# Patient Record
Sex: MALE | Race: WHITE | NOT HISPANIC OR LATINO | URBAN - METROPOLITAN AREA
[De-identification: names, ages, dates, MRNs, and addresses within clinical notes are randomized per-mention and may not be internally consistent; named-entity substitution may affect disease eponyms.]

---

## 2021-12-15 ENCOUNTER — WEB ENCOUNTER (OUTPATIENT)
Dept: URBAN - METROPOLITAN AREA CLINIC 74 | Facility: CLINIC | Age: 66
End: 2021-12-15

## 2021-12-15 ENCOUNTER — OFFICE VISIT (OUTPATIENT)
Dept: URBAN - METROPOLITAN AREA CLINIC 74 | Facility: CLINIC | Age: 66
End: 2021-12-15
Payer: MEDICARE

## 2021-12-15 DIAGNOSIS — R19.5 LOOSE STOOLS: ICD-10-CM

## 2021-12-15 DIAGNOSIS — K64.2 GRADE III HEMORRHOIDS: ICD-10-CM

## 2021-12-15 DIAGNOSIS — K62.5 RECTAL BLEED: ICD-10-CM

## 2021-12-15 DIAGNOSIS — Z79.01 BLOOD THINNED DUE TO LONG-TERM ANTICOAGULANT USE: ICD-10-CM

## 2021-12-15 PROBLEM — 711150003: Status: ACTIVE | Noted: 2021-12-15

## 2021-12-15 PROCEDURE — 99203 OFFICE O/P NEW LOW 30 MIN: CPT | Performed by: INTERNAL MEDICINE

## 2021-12-15 RX ORDER — COLESTIPOL HYDROCHLORIDE 1 G/1
1 TABLET, FILM COATED ORAL BID
Qty: 180 | Refills: 3 | OUTPATIENT
Start: 2021-12-15

## 2021-12-15 NOTE — HPI-TODAY'S VISIT:
Hx polyps, Colonoscopy 2018 Dr. Damon, 2 TA polyps, next due 2023. Hemorrhoids. Prior banding x4 2018 with Dr. Orellana. He did well for a wile. Now with chronic loose stools 2-3 daily, he requests Rx. No prior Debby. He has Afib, new onset, and Started Xarelto September, now he has hemorrhoid bleeding, painless, with most BM. Sometimes moderate dripping blood.

## 2022-01-07 ENCOUNTER — OFFICE VISIT (OUTPATIENT)
Dept: URBAN - METROPOLITAN AREA CLINIC 40 | Facility: CLINIC | Age: 67
End: 2022-01-07

## 2022-02-07 ENCOUNTER — TELEPHONE ENCOUNTER (OUTPATIENT)
Dept: URBAN - METROPOLITAN AREA CLINIC 23 | Facility: CLINIC | Age: 67
End: 2022-02-07

## 2023-01-31 ENCOUNTER — CLAIMS CREATED FROM THE CLAIM WINDOW (OUTPATIENT)
Dept: URBAN - METROPOLITAN AREA CLINIC 74 | Facility: CLINIC | Age: 68
End: 2023-01-31
Payer: MEDICARE

## 2023-01-31 VITALS
HEART RATE: 69 BPM | HEIGHT: 67 IN | BODY MASS INDEX: 32.96 KG/M2 | TEMPERATURE: 97.7 F | DIASTOLIC BLOOD PRESSURE: 69 MMHG | SYSTOLIC BLOOD PRESSURE: 119 MMHG | WEIGHT: 210 LBS

## 2023-01-31 DIAGNOSIS — Z86.010 PERSONAL HISTORY OF COLONIC POLYPS: ICD-10-CM

## 2023-01-31 DIAGNOSIS — Z79.01 CURRENT USE OF LONG TERM ANTICOAGULATION: ICD-10-CM

## 2023-01-31 PROCEDURE — 99213 OFFICE O/P EST LOW 20 MIN: CPT | Performed by: PHYSICIAN ASSISTANT

## 2023-01-31 RX ORDER — COLESTIPOL HYDROCHLORIDE 1 G/1
1 TABLET, FILM COATED ORAL BID
Qty: 180 | Refills: 3 | Status: ACTIVE | COMMUNITY
Start: 2021-12-15

## 2023-01-31 RX ORDER — POLYETHYLENE GLYCOL 3350, SODIUM SULFATE ANHYDROUS, SODIUM BICARBONATE, SODIUM CHLORIDE, POTASSIUM CHLORIDE 236; 22.74; 6.74; 5.86; 2.97 G/4L; G/4L; G/4L; G/4L; G/4L
AS DIRECTED FOR COLON CLEANSER POWDER, FOR SOLUTION ORAL
Qty: 4000 MILLILITER | Refills: 0 | OUTPATIENT
Start: 2023-01-31 | End: 2023-02-01

## 2023-01-31 NOTE — HPI-TODAY'S VISIT:
The is 67-year-old male with past medical history as noted below known to Dr. Damon is here today to schedule his surveillance colonoscopy. No new GI issues.  -- The patient denies dyspepsia, dysphagia, odynophagia, hemoptysis, hematemesis, nausea, vomiting, regurgitation, melena, constipation, diarrhea, abdominal pain, hematochezia, fever, chills, chest pain, SOB, or any other GI complaints today.  -- The patient denies ETOH, Tobacco, and Illicit drug use.  -- The patient is up to date with Flu, Pneumonia, Shingles, and COVID vaccine 3/3. -- Barbara using NSAID's.   Procedure: -- Colonoscopy with polypectomy on 05/07/2018 by Dr. Damon noted internal hemorrhoids.One 2 mm polyp in the cecum, removed with a cold biopsy forceps.  Resected and retrieved. One 2 mm polyp in the proximal ascending colon, removed with a cold biopsy forceps.  Resected and retrieved.One 1 mm polyp in the distal sigmoid colon, removed with a cold biopsy forcep.  Resected and retrieved.  The examination was otherwise normal.  Biopsy proximal ascending colon polyp tubular adenoma.  Cecum colon polyp tubular adenoma.  Distal sigmoid colon polyp mucosal prolapse type polyp.  No dysplasia or malignancy.

## 2023-03-01 PROBLEM — 428283002: Status: ACTIVE | Noted: 2023-01-20

## 2023-04-04 ENCOUNTER — OFFICE VISIT (OUTPATIENT)
Dept: URBAN - METROPOLITAN AREA SURGERY CENTER 30 | Facility: SURGERY CENTER | Age: 68
End: 2023-04-04
Payer: MEDICARE

## 2023-04-04 ENCOUNTER — CLAIMS CREATED FROM THE CLAIM WINDOW (OUTPATIENT)
Dept: URBAN - METROPOLITAN AREA CLINIC 4 | Facility: CLINIC | Age: 68
End: 2023-04-04
Payer: MEDICARE

## 2023-04-04 DIAGNOSIS — Z86.010 ADENOMAS PERSONAL HISTORY OF COLONIC POLYPS: ICD-10-CM

## 2023-04-04 DIAGNOSIS — D12.3 ADENOMA OF TRANSVERSE COLON: ICD-10-CM

## 2023-04-04 DIAGNOSIS — K63.89 OTHER SPECIFIED DISEASES OF INTESTINE: ICD-10-CM

## 2023-04-04 PROCEDURE — G8907 PT DOC NO EVENTS ON DISCHARG: HCPCS | Performed by: INTERNAL MEDICINE

## 2023-04-04 PROCEDURE — 88305 TISSUE EXAM BY PATHOLOGIST: CPT | Performed by: PATHOLOGY

## 2023-04-04 PROCEDURE — 45385 COLONOSCOPY W/LESION REMOVAL: CPT | Performed by: INTERNAL MEDICINE

## 2023-05-01 ENCOUNTER — OFFICE VISIT (OUTPATIENT)
Dept: URBAN - METROPOLITAN AREA CLINIC 74 | Facility: CLINIC | Age: 68
End: 2023-05-01

## 2023-05-23 ENCOUNTER — TELEPHONE ENCOUNTER (OUTPATIENT)
Dept: URBAN - METROPOLITAN AREA CLINIC 74 | Facility: CLINIC | Age: 68
End: 2023-05-23

## 2023-05-23 RX ORDER — COLESTIPOL HYDROCHLORIDE 1 G/1
1 TABLET TABLET, FILM COATED ORAL TWICE A DAY
Qty: 180 TABLET | Refills: 3
Start: 2021-12-15

## 2023-11-01 ENCOUNTER — CLAIMS CREATED FROM THE CLAIM WINDOW (OUTPATIENT)
Dept: URBAN - METROPOLITAN AREA CLINIC 74 | Facility: CLINIC | Age: 68
End: 2023-11-01
Payer: MEDICARE

## 2023-11-01 ENCOUNTER — LAB OUTSIDE AN ENCOUNTER (OUTPATIENT)
Dept: URBAN - METROPOLITAN AREA CLINIC 74 | Facility: CLINIC | Age: 68
End: 2023-11-01

## 2023-11-01 VITALS
DIASTOLIC BLOOD PRESSURE: 62 MMHG | SYSTOLIC BLOOD PRESSURE: 148 MMHG | OXYGEN SATURATION: 66 % | HEIGHT: 67 IN | BODY MASS INDEX: 31.61 KG/M2 | WEIGHT: 201.4 LBS | TEMPERATURE: 97.7 F | HEART RATE: 94 BPM

## 2023-11-01 DIAGNOSIS — K57.90 DIVERTICULOSIS: ICD-10-CM

## 2023-11-01 DIAGNOSIS — R10.11 RUQ ABDOMINAL PAIN: ICD-10-CM

## 2023-11-01 DIAGNOSIS — R74.01 TRANSAMINITIS: ICD-10-CM

## 2023-11-01 DIAGNOSIS — R14.0 ABDOMINAL DISTENSION: ICD-10-CM

## 2023-11-01 DIAGNOSIS — R10.823 RIGHT LOWER QUADRANT ABDOMINAL TENDERNESS WITH REBOUND TENDERNESS: ICD-10-CM

## 2023-11-01 DIAGNOSIS — K59.00 ACUTE CONSTIPATION: ICD-10-CM

## 2023-11-01 DIAGNOSIS — Z86.010 PERSONAL HISTORY OF COLONIC POLYPS: ICD-10-CM

## 2023-11-01 DIAGNOSIS — K80.20 SYMPTOMATIC CHOLELITHIASIS: ICD-10-CM

## 2023-11-01 PROBLEM — 70342003: Status: ACTIVE | Noted: 2023-11-01

## 2023-11-01 PROBLEM — 266474003: Status: ACTIVE | Noted: 2023-11-01

## 2023-11-01 PROBLEM — 197119006: Status: ACTIVE | Noted: 2023-11-01

## 2023-11-01 PROBLEM — 397881000: Status: ACTIVE | Noted: 2023-11-01

## 2023-11-01 PROCEDURE — 99214 OFFICE O/P EST MOD 30 MIN: CPT | Performed by: PHYSICIAN ASSISTANT

## 2023-11-01 RX ORDER — COLESTIPOL HYDROCHLORIDE 1 G/1
1 TABLET TABLET, FILM COATED ORAL TWICE A DAY
Qty: 180 TABLET | Refills: 3 | Status: ACTIVE | COMMUNITY
Start: 2021-12-15

## 2023-11-01 RX ORDER — ESOMEPRAZOLE MAGNESIUM 40 MG/1
1 CAPSULE CAPSULE, DELAYED RELEASE ORAL ONCE A DAY
Status: ACTIVE | COMMUNITY

## 2023-11-01 NOTE — PHYSICAL EXAM GASTROINTESTINAL
Abdomen , soft, RUQ and RLQ tenderness and rebound tenderness, distended , no guarding or rigidity , no masses palpable , normal bowel sounds , Liver and Spleen,  no hepatosplenomegaly , liver nontender

## 2023-11-01 NOTE — HPI-TODAY'S VISIT:
The is 67-year-old male with past medical history as noted below known to Dr. Damon is here today to discuss his recent abnormal labs. The patient with acute onset of abdominal pain and distention. He drinks -2 beer per week. No ETOH X one week. He went to ED at  on 10/30/2023 and 10/31/2023 with labs and imaging as noted below. He has been taking Esmoperazole 40 mg once daily for a long time. He was give Oxycodone as needed for pain and he is now constipated. His last bowel movement was on Sunday. He has nausea and vomiting since Monday multiple episodes. No much appetite. No fever or chills. No other GI symptoms. He was not told at ED that he has gallstone. He was only advised to be seen by his primary GI physician.   -- The patient denies dyspepsia, dysphagia, odynophagia, hemoptysis, hematemesis, regurgitation, melena, diarrhea,  hematochezia, fever, chills, chest pain, SOB, or any other GI complaints today.  -- The patient denies ETOH,Tobacco, and Illicit drug use.  -- The patient is up to date with Flu, Pneumonia, Shingles, and COVID vaccine 3/3. -- Barbara using NSAID's.   Diagnostic stusies; -- CT of abdomen and pelvis on 10/31/2023 with no acute abnormalities within the abdomen or pelvis. Diverticulosis without findings of acute diverticulitis.  Cholelithiasis without findings of acute cholecystitis.  -- CTA on 10/31/2023 with no acute pulmonary embolism. No active disease in the chest.  -- Labs on10/31/2023 CBC with WBC 10.6, hemoglobin 13.4, hematocrit 43.3, and platelet 264.  CMP with BUN 14, creatinine 0.8, , AST 55, , and TB 0.8.  Lipase 56.  Hemoglobin A1c 6.3.  Lipid panel with cholesterol 111, triglyceride 104, HDL 32, and LDL 58.  Procedure: -- Colonoscopy with polypectomy on 04/13/2023 by Dr. Ledezma noted one 3 mm polyp in the cecum, removed with a cold snare.  Resected and retrieved. One 3 mm polyp in the transverse colon, removed with a cold snare.  Resected and retrieved.  Diverticulosis in the sigmoid colon and in the descending colon.  Internal hemorrhoids.  Repeat colonoscopy in 5 years for surveillance.  Biopsy with hyperplastic and tubular adenoma colon polyps.

## 2023-11-08 LAB
% SATURATION: 6
A/G RATIO: 1.5
ABSOLUTE BASOPHILS: 12
ABSOLUTE EOSINOPHILS: 12
ABSOLUTE LYMPHOCYTES: 1065
ABSOLUTE MONOCYTES: 1275
ABSOLUTE NEUTROPHILS: 9337
ACTIN (SMOOTH MUSCLE) ANTIBODY (IGG): <20
ALBUMIN: 4.2
ALKALINE PHOSPHATASE: 170
ALPHA-1-ANTITRYPSIN QN: 196
ALT (SGPT): 64
ANA SCREEN, IFA: POSITIVE
AST (SGOT): 28
BASOPHILS: 0.1
BILIRUBIN, TOTAL: 2.4
BUN/CREATININE RATIO: (no result)
BUN: 14
CALCIUM: 9
CARBON DIOXIDE, TOTAL: 30
CERULOPLASMIN: 34
CHLORIDE: 102
CREATININE: 0.82
EGFR: 94
EOSINOPHILS: 0.1
FERRITIN: 49
GGT: 133
GLOBULIN, TOTAL: 2.8
GLUCOSE: 131
HBSAG SCREEN: (no result)
HEMATOCRIT: 41.5
HEMOGLOBIN: 14
HEP A AB, IGM: (no result)
HEP B CORE AB, IGM: (no result)
HEPATITIS C ANTIBODY: (no result)
HEREDITARY HEMOCHROMATOSIS DNA MUT: (no result)
IMMUNOGLOBULIN A, QN, SERUM: 228
INTERPRETATION: (no result)
INTERPRETATION: (no result)
IRON BINDING CAPACITY: 407
IRON, TOTAL: 25
LYMPHOCYTES: 9.1
MCH: 30
MCHC: 33.7
MCV: 89.1
MITOCHONDRIAL (M2) ANTIBODY: <=20
MONOCYTES: 10.9
MPV: 10.3
NEUTROPHILS: 79.8
PLATELET COUNT: 301
POTASSIUM: 4.2
PROTEIN, TOTAL: 7
RDW: 13.4
RED BLOOD CELL COUNT: 4.66
RHEUMATOID FACTOR: <14
SJOGREN'S ANTIBODY (SS-A): (no result)
SJOGREN'S ANTIBODY (SS-B): (no result)
SODIUM: 144
T-TRANSGLUTAMINASE (TTG) IGA: <1
WHITE BLOOD CELL COUNT: 11.7

## 2023-11-09 ENCOUNTER — TELEPHONE ENCOUNTER (OUTPATIENT)
Dept: URBAN - METROPOLITAN AREA CLINIC 74 | Facility: CLINIC | Age: 68
End: 2023-11-09

## 2023-11-15 PROBLEM — 197321007: Status: ACTIVE | Noted: 2023-11-15

## 2023-11-15 PROBLEM — 235595009: Status: ACTIVE | Noted: 2023-11-15

## 2023-11-21 ENCOUNTER — OFFICE VISIT (OUTPATIENT)
Dept: URBAN - METROPOLITAN AREA CLINIC 74 | Facility: CLINIC | Age: 68
End: 2023-11-21
Payer: MEDICARE

## 2023-11-21 VITALS
BODY MASS INDEX: 31.01 KG/M2 | HEART RATE: 56 BPM | SYSTOLIC BLOOD PRESSURE: 122 MMHG | WEIGHT: 197.6 LBS | OXYGEN SATURATION: 97 % | TEMPERATURE: 97.3 F | DIASTOLIC BLOOD PRESSURE: 66 MMHG | HEIGHT: 67 IN

## 2023-11-21 DIAGNOSIS — K57.90 DIVERTICULOSIS: ICD-10-CM

## 2023-11-21 DIAGNOSIS — K76.0 HEPATIC STEATOSIS: ICD-10-CM

## 2023-11-21 DIAGNOSIS — Z79.899 LONG-TERM CURRENT USE OF PROTON PUMP INHIBITOR THERAPY: ICD-10-CM

## 2023-11-21 DIAGNOSIS — K21.9 CHRONIC GERD: ICD-10-CM

## 2023-11-21 DIAGNOSIS — R74.01 TRANSAMINITIS: ICD-10-CM

## 2023-11-21 DIAGNOSIS — D50.0 IRON DEFICIENCY ANEMIA DUE TO CHRONIC BLOOD LOSS: ICD-10-CM

## 2023-11-21 DIAGNOSIS — Z86.010 PERSONAL HISTORY OF COLONIC POLYPS: ICD-10-CM

## 2023-11-21 PROBLEM — 724556004: Status: ACTIVE | Noted: 2023-11-21

## 2023-11-21 PROCEDURE — 99213 OFFICE O/P EST LOW 20 MIN: CPT | Performed by: PHYSICIAN ASSISTANT

## 2023-11-21 RX ORDER — COLESTIPOL HYDROCHLORIDE 1 G/1
1 TABLET TABLET, FILM COATED ORAL TWICE A DAY
Qty: 180 TABLET | Refills: 3 | Status: ACTIVE | COMMUNITY
Start: 2021-12-15

## 2023-11-21 RX ORDER — ESOMEPRAZOLE MAGNESIUM 40 MG/1
1 CAPSULE CAPSULE, DELAYED RELEASE ORAL ONCE A DAY
Status: ACTIVE | COMMUNITY

## 2023-11-21 NOTE — HPI-TODAY'S VISIT:
The is 67-year-old male with past medical history as noted below known to Dr. Ledezma is presenting to our clinic todayfor follow from recent admission to  for gallbladder surgery. The patient is S/P Laparoscopic cholecystectomy with cholangiogram by Dr. Sorensen on 11/03/2023. Biopsy with acute on chronic cholecystitis and cholelithiasis. Labs on 11/05/2023 at the time of discharged CMP with , AST 44, ALT 58, and TB 0.6. CBC with Hgb 11.5, Hct 35.9, and . The patient is doing well and he denies any GI issues today except some soft bowel movement but he has Colestipol and he started back taking it once or twice daily with good rseults. He states that he takes Esmoprazole 40 mg very seldom since his surgery. Feeling much better since last visit.   -- The patient denies dyspepsia, dysphagia, odynophagia, hemoptysis, hematemesis, nausea, vomiting, regurgitation, melena, diarrhea, constipation, hematochezia, fever, chills, chest pain, SOB, or any other GI complaints today.  -- The patient denies ETOH,Tobacco, and Illicit drug use.  -- The patient is up to date with Flu, Pneumonia, Shingles, and COVID vaccine 3/3. -- Barbara using NSAID's.   Diagnostic stusies: --  Labs on 11/05/2023 CMP with , AST 44, ALT 58, and TB 0.6. CBC with Hgb 11.5, Hct 35.9, and .   -- MRI/MRCP on 11/01/2023 with diffuse pericholecystic edema suggestive of acute cholecystitis. No gallstones are identified on this exam. A small calcified gallstone was preset on the recent CT. No bile duct dilation. No definite ductal filling defects identified. Hepatic steatosis. Prominent mildly enlarged periportal and portal caval lymph nodes measuring up to 1.3 cm. Prominent retroperitoneal lymph node measuring 0.8 cm. Pancreas, spleen, and adrenal glands are normal. 5 mm bilateral renal cysts.   -- Labs on 11/01/2023 with Ceruloplasmin 34, , alpha-1 antitrypsin 196, ASMA negative, AMA negative, WILBER positive 1:40, celiac's sprue negative, CMP with , AST 28, ALT 64, and T bili 2.4. Iron panel with total iron 25, total iron binding capacity 407, percentage saturation 6, and ferritin 149. CBC with WBC 11.7, hemoglobin 14.0, hematocrit 41.5, and platelet 301. HHMA negative. Acute hepatitis panel negative.        -- CT of abdomen and pelvis on 10/31/2023 with no acute abnormalities within the abdomen or pelvis. Diverticulosis without findings of acute diverticulitis.  Cholelithiasis without findings of acute cholecystitis.  -- CTA on 10/31/2023 with no acute pulmonary embolism. No active disease in the chest.  -- Labs on10/31/2023 CBC with WBC 10.6, hemoglobin 13.4, hematocrit 43.3, and platelet 264.  CMP with BUN 14, creatinine 0.8, , AST 55, , and TB 0.8.  Lipase 56.  Hemoglobin A1c 6.3.  Lipid panel with cholesterol 111, triglyceride 104, HDL 32, and LDL 58.  Procedure: -- Colonoscopy with polypectomy on 04/13/2023 by Dr. Ledezma noted one 3 mm polyp in the cecum, removed with a cold snare.  Resected and retrieved. One 3 mm polyp in the transverse colon, removed with a cold snare.  Resected and retrieved.  Diverticulosis in the sigmoid colon and in the descending colon.  Internal hemorrhoids.  Repeat colonoscopy in 5 years for surveillance.  Biopsy with hyperplastic and tubular adenoma colon polyps.

## 2023-11-22 LAB
% SATURATION: 24
A/G RATIO: 1.6
ABSOLUTE BASOPHILS: 50
ABSOLUTE EOSINOPHILS: 223
ABSOLUTE LYMPHOCYTES: 1885
ABSOLUTE MONOCYTES: 645
ABSOLUTE NEUTROPHILS: 3398
ALBUMIN: 4
ALKALINE PHOSPHATASE: 137
ALT (SGPT): 65
AST (SGOT): 34
BASOPHILS: 0.8
BILIRUBIN, TOTAL: 1.3
BUN/CREATININE RATIO: (no result)
BUN: 14
CALCIUM: 9.4
CARBON DIOXIDE, TOTAL: 27
CHLORIDE: 106
CREATININE: 0.74
EGFR: 97
EOSINOPHILS: 3.6
FERRITIN: 33
GGT: 85
GLOBULIN, TOTAL: 2.5
GLUCOSE: 84
HEMATOCRIT: 40.9
HEMOGLOBIN: 13.2
IRON BINDING CAPACITY: 373
IRON, TOTAL: 89
LYMPHOCYTES: 30.4
MCH: 28.8
MCHC: 32.3
MCV: 89.1
MONOCYTES: 10.4
MPV: 10
NEUTROPHILS: 54.8
PLATELET COUNT: 286
POTASSIUM: 4.1
PROTEIN, TOTAL: 6.5
RDW: 13.8
RED BLOOD CELL COUNT: 4.59
SODIUM: 142
WHITE BLOOD CELL COUNT: 6.2

## 2023-11-29 ENCOUNTER — APPOINTMENT (RX ONLY)
Dept: URBAN - METROPOLITAN AREA CLINIC 162 | Facility: CLINIC | Age: 68
Setting detail: DERMATOLOGY
End: 2023-11-29

## 2023-11-29 DIAGNOSIS — L81.4 OTHER MELANIN HYPERPIGMENTATION: ICD-10-CM

## 2023-11-29 DIAGNOSIS — L73.8 OTHER SPECIFIED FOLLICULAR DISORDERS: ICD-10-CM

## 2023-11-29 DIAGNOSIS — L57.0 ACTINIC KERATOSIS: ICD-10-CM

## 2023-11-29 DIAGNOSIS — L82.1 OTHER SEBORRHEIC KERATOSIS: ICD-10-CM

## 2023-11-29 DIAGNOSIS — D22 MELANOCYTIC NEVI: ICD-10-CM

## 2023-11-29 PROBLEM — D22.62 MELANOCYTIC NEVI OF LEFT UPPER LIMB, INCLUDING SHOULDER: Status: ACTIVE | Noted: 2023-11-29

## 2023-11-29 PROBLEM — D23.39 OTHER BENIGN NEOPLASM OF SKIN OF OTHER PARTS OF FACE: Status: ACTIVE | Noted: 2023-11-29

## 2023-11-29 PROBLEM — D22.5 MELANOCYTIC NEVI OF TRUNK: Status: ACTIVE | Noted: 2023-11-29

## 2023-11-29 PROBLEM — D22.39 MELANOCYTIC NEVI OF OTHER PARTS OF FACE: Status: ACTIVE | Noted: 2023-11-29

## 2023-11-29 PROBLEM — D22.61 MELANOCYTIC NEVI OF RIGHT UPPER LIMB, INCLUDING SHOULDER: Status: ACTIVE | Noted: 2023-11-29

## 2023-11-29 PROCEDURE — ? LIQUID NITROGEN

## 2023-11-29 PROCEDURE — ? COUNSELING

## 2023-11-29 PROCEDURE — 99213 OFFICE O/P EST LOW 20 MIN: CPT | Mod: 25

## 2023-11-29 PROCEDURE — 17000 DESTRUCT PREMALG LESION: CPT

## 2023-11-29 ASSESSMENT — LOCATION DETAILED DESCRIPTION DERM
LOCATION DETAILED: LEFT SUPERIOR MEDIAL FOREHEAD
LOCATION DETAILED: LEFT PROXIMAL DORSAL FOREARM
LOCATION DETAILED: RIGHT DISTAL DORSAL FOREARM
LOCATION DETAILED: LEFT ELBOW
LOCATION DETAILED: RIGHT INFERIOR LATERAL MALAR CHEEK
LOCATION DETAILED: RIGHT PROXIMAL DORSAL FOREARM
LOCATION DETAILED: INFERIOR THORACIC SPINE
LOCATION DETAILED: RIGHT ELBOW
LOCATION DETAILED: RIGHT MEDIAL INFERIOR CHEST
LOCATION DETAILED: STERNUM
LOCATION DETAILED: INFERIOR MID FOREHEAD
LOCATION DETAILED: LEFT SUPERIOR MEDIAL UPPER BACK
LOCATION DETAILED: RIGHT FOREHEAD
LOCATION DETAILED: RIGHT SUPERIOR PARIETAL SCALP
LOCATION DETAILED: PERIUMBILICAL SKIN
LOCATION DETAILED: EPIGASTRIC SKIN
LOCATION DETAILED: RIGHT SUPERIOR MEDIAL MALAR CHEEK

## 2023-11-29 ASSESSMENT — LOCATION SIMPLE DESCRIPTION DERM
LOCATION SIMPLE: SCALP
LOCATION SIMPLE: CHEST
LOCATION SIMPLE: UPPER BACK
LOCATION SIMPLE: LEFT UPPER BACK
LOCATION SIMPLE: RIGHT FOREHEAD
LOCATION SIMPLE: RIGHT CHEEK
LOCATION SIMPLE: ABDOMEN
LOCATION SIMPLE: LEFT ELBOW
LOCATION SIMPLE: LEFT FOREHEAD
LOCATION SIMPLE: RIGHT ELBOW
LOCATION SIMPLE: LEFT FOREARM
LOCATION SIMPLE: RIGHT FOREARM
LOCATION SIMPLE: INFERIOR FOREHEAD

## 2023-11-29 ASSESSMENT — LOCATION ZONE DERM
LOCATION ZONE: SCALP
LOCATION ZONE: TRUNK
LOCATION ZONE: ARM
LOCATION ZONE: FACE

## 2024-01-29 ENCOUNTER — LAB OUTSIDE AN ENCOUNTER (OUTPATIENT)
Dept: URBAN - METROPOLITAN AREA CLINIC 74 | Facility: CLINIC | Age: 69
End: 2024-01-29

## 2024-01-29 ENCOUNTER — APPOINTMENT (RX ONLY)
Dept: URBAN - METROPOLITAN AREA CLINIC 162 | Facility: CLINIC | Age: 69
Setting detail: DERMATOLOGY
End: 2024-01-29

## 2024-01-29 ENCOUNTER — DASHBOARD ENCOUNTERS (OUTPATIENT)
Age: 69
End: 2024-01-29

## 2024-01-29 ENCOUNTER — OFFICE VISIT (OUTPATIENT)
Dept: URBAN - METROPOLITAN AREA CLINIC 74 | Facility: CLINIC | Age: 69
End: 2024-01-29
Payer: MEDICARE

## 2024-01-29 VITALS
BODY MASS INDEX: 31.96 KG/M2 | HEIGHT: 67 IN | TEMPERATURE: 97 F | HEART RATE: 70 BPM | WEIGHT: 203.6 LBS | SYSTOLIC BLOOD PRESSURE: 138 MMHG | DIASTOLIC BLOOD PRESSURE: 70 MMHG

## 2024-01-29 DIAGNOSIS — B07.8 OTHER VIRAL WARTS: ICD-10-CM

## 2024-01-29 DIAGNOSIS — L73.8 OTHER SPECIFIED FOLLICULAR DISORDERS: ICD-10-CM

## 2024-01-29 DIAGNOSIS — K76.0 HEPATIC STEATOSIS: ICD-10-CM

## 2024-01-29 DIAGNOSIS — D18.0 HEMANGIOMA: ICD-10-CM

## 2024-01-29 DIAGNOSIS — D22 MELANOCYTIC NEVI: ICD-10-CM

## 2024-01-29 DIAGNOSIS — K21.9 CHRONIC GERD: ICD-10-CM

## 2024-01-29 DIAGNOSIS — L82.1 OTHER SEBORRHEIC KERATOSIS: ICD-10-CM

## 2024-01-29 DIAGNOSIS — L81.4 OTHER MELANIN HYPERPIGMENTATION: ICD-10-CM

## 2024-01-29 PROBLEM — D23.39 OTHER BENIGN NEOPLASM OF SKIN OF OTHER PARTS OF FACE: Status: ACTIVE | Noted: 2024-01-29

## 2024-01-29 PROBLEM — D18.01 HEMANGIOMA OF SKIN AND SUBCUTANEOUS TISSUE: Status: ACTIVE | Noted: 2024-01-29

## 2024-01-29 PROBLEM — D22.5 MELANOCYTIC NEVI OF TRUNK: Status: ACTIVE | Noted: 2024-01-29

## 2024-01-29 PROCEDURE — 17110 DESTRUCTION B9 LES UP TO 14: CPT

## 2024-01-29 PROCEDURE — 99213 OFFICE O/P EST LOW 20 MIN: CPT | Mod: 25

## 2024-01-29 PROCEDURE — 99214 OFFICE O/P EST MOD 30 MIN: CPT | Performed by: PHYSICIAN ASSISTANT

## 2024-01-29 PROCEDURE — ? LIQUID NITROGEN

## 2024-01-29 PROCEDURE — ? COUNSELING

## 2024-01-29 RX ORDER — ESOMEPRAZOLE MAGNESIUM 40 MG/1
1 CAPSULE CAPSULE, DELAYED RELEASE ORAL ONCE A DAY
Qty: 90 CAPSULE | Refills: 3

## 2024-01-29 RX ORDER — COLESTIPOL HYDROCHLORIDE 1 G/1
1 TABLET TABLET, FILM COATED ORAL TWICE A DAY
Qty: 180 TABLET | Refills: 3 | Status: ACTIVE | COMMUNITY
Start: 2021-12-15

## 2024-01-29 RX ORDER — ESOMEPRAZOLE MAGNESIUM 40 MG/1
1 CAPSULE CAPSULE, DELAYED RELEASE ORAL ONCE A DAY
Status: ACTIVE | COMMUNITY

## 2024-01-29 ASSESSMENT — LOCATION DETAILED DESCRIPTION DERM
LOCATION DETAILED: LEFT MEDIAL UPPER BACK
LOCATION DETAILED: SUPERIOR THORACIC SPINE
LOCATION DETAILED: RIGHT NASAL ALA
LOCATION DETAILED: INFERIOR THORACIC SPINE
LOCATION DETAILED: RIGHT MEDIAL UPPER BACK
LOCATION DETAILED: RIGHT CLAVICULAR SKIN

## 2024-01-29 ASSESSMENT — LOCATION ZONE DERM
LOCATION ZONE: TRUNK
LOCATION ZONE: NOSE

## 2024-01-29 ASSESSMENT — LOCATION SIMPLE DESCRIPTION DERM
LOCATION SIMPLE: LEFT UPPER BACK
LOCATION SIMPLE: RIGHT CLAVICULAR SKIN
LOCATION SIMPLE: RIGHT UPPER BACK
LOCATION SIMPLE: RIGHT NOSE
LOCATION SIMPLE: UPPER BACK

## 2024-01-29 NOTE — PROCEDURE: COUNSELING
Detail Level: Generalized
Detail Level: Detailed
Patient Specific Counseling (Will Not Stick From Patient To Patient): Return to clinic if changes occur, biopsy of spot if changes

## 2024-01-29 NOTE — HPI-TODAY'S VISIT:
The is 67-year-old male with past medical history as noted below known to Dr. Ledezma is presenting to our clinic today for pain below ribcage on the right side. He continues on Esomeprazole Magnesium 40 mg as needed. He states that his RUQ abdominal pain started in 12/2023. He states bowel movements helps with discomfort and eating worsening his symptoms. He two bowel movements per day. He continues on Colestipol 1 g once daily for chronic diarrhea.  No changes with urination. No other GI issues today.   -- The patient denies dyspepsia, dysphagia, odynophagia, hemoptysis, hematemesis, nausea, vomiting, regurgitation, melena, diarrhea, constipation, hematochezia, fever, chills, chest pain, SOB, or any other GI complaints today.  -- The patient denies ETOH,Tobacco, and Illicit drug use.  -- The patient is up to date with Flu, Pneumonia, Shingles, and COVID vaccine. -- Barbara using NSAID's.   Diagnostic stusies: --  Labs on 11/05/2023 CMP with , AST 44, ALT 58, and TB 0.6. CBC with Hgb 11.5, Hct 35.9, and .   -- MRI/MRCP on 11/01/2023 with diffuse pericholecystic edema suggestive of acute cholecystitis. No gallstones are identified on this exam. A small calcified gallstone was preset on the recent CT. No bile duct dilation. No definite ductal filling defects identified. Hepatic steatosis. Prominent mildly enlarged periportal and portal caval lymph nodes measuring up to 1.3 cm. Prominent retroperitoneal lymph node measuring 0.8 cm. Pancreas, spleen, and adrenal glands are normal. 5 mm bilateral renal cysts.   -- Labs on 11/01/2023 with Ceruloplasmin 34, , alpha-1 antitrypsin 196, ASMA negative, AMA negative, WILBER positive 1:40, celiac's sprue negative, CMP with , AST 28, ALT 64, and T bili 2.4. Iron panel with total iron 25, total iron binding capacity 407, percentage saturation 6, and ferritin 149. CBC with WBC 11.7, hemoglobin 14.0, hematocrit 41.5, and platelet 301. HHMA negative. Acute hepatitis panel negative.         Procedure: -- Colonoscopy with polypectomy on 04/13/2023 by Dr. Ledezma noted one 3 mm polyp in the cecum, removed with a cold snare.  Resected and retrieved. One 3 mm polyp in the transverse colon, removed with a cold snare.  Resected and retrieved.  Diverticulosis in the sigmoid colon and in the descending colon.  Internal hemorrhoids.  Repeat colonoscopy in 5 years for surveillance.  Biopsy with hyperplastic and tubular adenoma colon polyps.

## 2024-01-29 NOTE — HPI: OTHER
Condition:: FBSE
Please Describe Your Condition:: No hx of skin cancer
Condition:: Spot
Please Describe Your Condition:: Left nostril, previous fibrous papule

## 2024-02-13 ENCOUNTER — US (OUTPATIENT)
Dept: URBAN - METROPOLITAN AREA CLINIC 73 | Facility: CLINIC | Age: 69
End: 2024-02-13
Payer: MEDICARE

## 2024-02-13 DIAGNOSIS — K76.0 FATTY (CHANGE OF) LIVER: ICD-10-CM

## 2024-02-13 DIAGNOSIS — R10.11 RUQ ABDOMINAL PAIN: ICD-10-CM

## 2024-02-13 PROCEDURE — 76705 ECHO EXAM OF ABDOMEN: CPT | Performed by: INTERNAL MEDICINE

## 2024-04-09 ENCOUNTER — APPOINTMENT (RX ONLY)
Dept: URBAN - METROPOLITAN AREA CLINIC 162 | Facility: CLINIC | Age: 69
Setting detail: DERMATOLOGY
End: 2024-04-09

## 2024-04-09 PROBLEM — D48.5 NEOPLASM OF UNCERTAIN BEHAVIOR OF SKIN: Status: ACTIVE | Noted: 2024-04-09

## 2024-04-09 PROCEDURE — 11102 TANGNTL BX SKIN SINGLE LES: CPT

## 2024-04-09 PROCEDURE — ? BIOPSY BY SHAVE METHOD

## 2024-04-09 NOTE — HPI: SKIN LESION
Is This A New Presentation, Or A Follow-Up?: Skin Lesion
Additional History: Pt says bleeds, prev dx as fibrous papule

## 2024-04-17 PROBLEM — 711150003: Status: ACTIVE | Noted: 2024-04-17

## 2024-05-14 ENCOUNTER — OFFICE VISIT (OUTPATIENT)
Dept: URBAN - METROPOLITAN AREA SURGERY CENTER 30 | Facility: SURGERY CENTER | Age: 69
End: 2024-05-14

## 2024-05-14 ENCOUNTER — CLAIMS CREATED FROM THE CLAIM WINDOW (OUTPATIENT)
Dept: URBAN - METROPOLITAN AREA CLINIC 4 | Facility: CLINIC | Age: 69
End: 2024-05-14
Payer: MEDICARE

## 2024-05-14 ENCOUNTER — OFFICE VISIT (OUTPATIENT)
Dept: URBAN - METROPOLITAN AREA SURGERY CENTER 30 | Facility: SURGERY CENTER | Age: 69
End: 2024-05-14
Payer: MEDICARE

## 2024-05-14 DIAGNOSIS — I10 ACCELERATED ESSENTIAL HYPERTENSION: ICD-10-CM

## 2024-05-14 DIAGNOSIS — K29.70 GASTRITIS, UNSPECIFIED, WITHOUT BLEEDING: ICD-10-CM

## 2024-05-14 DIAGNOSIS — K29.60 ADENOPAPILLOMATOSIS GASTRICA: ICD-10-CM

## 2024-05-14 DIAGNOSIS — R12 BURNING REFLUX: ICD-10-CM

## 2024-05-14 DIAGNOSIS — R10.13 ABDOMINAL DISCOMFORT, EPIGASTRIC: ICD-10-CM

## 2024-05-14 DIAGNOSIS — K31.89 ACHYLIA: ICD-10-CM

## 2024-05-14 PROCEDURE — 43239 EGD BIOPSY SINGLE/MULTIPLE: CPT | Performed by: CLINIC/CENTER

## 2024-05-14 PROCEDURE — 88342 IMHCHEM/IMCYTCHM 1ST ANTB: CPT | Performed by: STUDENT IN AN ORGANIZED HEALTH CARE EDUCATION/TRAINING PROGRAM

## 2024-05-14 PROCEDURE — 43239 EGD BIOPSY SINGLE/MULTIPLE: CPT | Performed by: INTERNAL MEDICINE

## 2024-05-14 PROCEDURE — G8907 PT DOC NO EVENTS ON DISCHARG: HCPCS | Performed by: CLINIC/CENTER

## 2024-05-14 PROCEDURE — 88305 TISSUE EXAM BY PATHOLOGIST: CPT | Performed by: STUDENT IN AN ORGANIZED HEALTH CARE EDUCATION/TRAINING PROGRAM

## 2024-05-14 PROCEDURE — 00731 ANES UPR GI NDSC PX NOS: CPT | Performed by: NURSE ANESTHETIST, CERTIFIED REGISTERED

## 2024-06-12 PROBLEM — 196735001: Status: ACTIVE | Noted: 2024-06-12

## 2024-06-13 ENCOUNTER — OFFICE VISIT (OUTPATIENT)
Dept: URBAN - METROPOLITAN AREA CLINIC 74 | Facility: CLINIC | Age: 69
End: 2024-06-13
Payer: MEDICARE

## 2024-06-13 ENCOUNTER — LAB OUTSIDE AN ENCOUNTER (OUTPATIENT)
Dept: URBAN - METROPOLITAN AREA CLINIC 74 | Facility: CLINIC | Age: 69
End: 2024-06-13

## 2024-06-13 VITALS
SYSTOLIC BLOOD PRESSURE: 136 MMHG | DIASTOLIC BLOOD PRESSURE: 66 MMHG | HEIGHT: 67 IN | WEIGHT: 207.6 LBS | BODY MASS INDEX: 32.58 KG/M2 | HEART RATE: 64 BPM | TEMPERATURE: 99.5 F | OXYGEN SATURATION: 95 %

## 2024-06-13 DIAGNOSIS — R74.01 TRANSAMINITIS: ICD-10-CM

## 2024-06-13 DIAGNOSIS — Z79.899 LONG-TERM CURRENT USE OF PROTON PUMP INHIBITOR THERAPY: ICD-10-CM

## 2024-06-13 DIAGNOSIS — K76.0 HEPATIC STEATOSIS: ICD-10-CM

## 2024-06-13 DIAGNOSIS — K21.9 CHRONIC GERD: ICD-10-CM

## 2024-06-13 PROCEDURE — 99213 OFFICE O/P EST LOW 20 MIN: CPT | Performed by: PHYSICIAN ASSISTANT

## 2024-06-13 RX ORDER — ESOMEPRAZOLE MAGNESIUM 40 MG/1
1 CAPSULE CAPSULE, DELAYED RELEASE ORAL ONCE A DAY
Qty: 90 CAPSULE | Refills: 3 | Status: ACTIVE | COMMUNITY

## 2024-06-13 RX ORDER — COLESTIPOL HYDROCHLORIDE 1 G/1
1 TABLET TABLET, FILM COATED ORAL TWICE A DAY
Qty: 180 TABLET | Refills: 3 | Status: ACTIVE | COMMUNITY
Start: 2021-12-15

## 2024-06-13 RX ORDER — COLESTIPOL HYDROCHLORIDE 1 G/1
1 TABLET TABLET, FILM COATED ORAL TWICE A DAY
Qty: 180 TABLET | Refills: 3
Start: 2021-12-15

## 2024-06-13 RX ORDER — ESOMEPRAZOLE MAGNESIUM 40 MG/1
1 CAPSULE CAPSULE, DELAYED RELEASE ORAL ONCE A DAY
Qty: 90 CAPSULE | Refills: 3

## 2024-06-13 NOTE — HPI-TODAY'S VISIT:
The patient is 69-year-old male with past medical history as noted below known to Dr. Ledezma is presenting to our clinic today to discuss his recent labs, US, and EGD results. Esomperazole 40 mg once daily. Colestipol 1 g every 12 hours. No new GI issues today.    Diagnostic stusies: -- RUQ US on 02/13/2024 with fatty infiltration of the liver.  Status postcholecystectomy.  No free fluid.   --  Labs on 11/05/2023 CMP with , AST 44, ALT 58, and TB 0.6. CBC with Hgb 11.5, Hct 35.9, and .    Procedures: -- EGD with biopsy on 05/14/2024 by Dr. Ledezma noted normal esophagus.  Normal stomach.  Normal examined duodenum.  Biopsy with chronic gastritis.  No H.pylori organism or intestinal metaplasia.  -- Colonoscopy with polypectomy on 04/13/2023 by Dr. Ledezma noted one 3 mm polyp in the cecum, removed with a cold snare.  Resected and retrieved. One 3 mm polyp in the transverse colon, removed with a cold snare.  Resected and retrieved.  Diverticulosis in the sigmoid colon and in the descending colon.  Internal hemorrhoids.  Repeat colonoscopy in 5 years for surveillance.  Biopsy with hyperplastic and tubular adenoma colon polyps.

## 2024-06-26 LAB
ABSOLUTE BASOPHILS: 28
ABSOLUTE EOSINOPHILS: 132
ABSOLUTE LYMPHOCYTES: 1876
ABSOLUTE MONOCYTES: 490
ABSOLUTE NEUTROPHILS: 2976
ALBUMIN/GLOBULIN RATIO: 1.6
ALBUMIN: 3.8
ALKALINE PHOSPHATASE: 82
ALT: 24
AST: 19
BASOPHILS: 0.5
BILIRUBIN, TOTAL: 1
BUN/CREATININE RATIO: (no result)
CALCIUM: 8.6
CARBON DIOXIDE: 25
CHLORIDE: 106
CREATININE: 0.79
EGFR: 95
EOSINOPHILS: 2.4
FIB 4 INDEX: 1.29
FIB 4 INTERPRETATION: (no result)
GGT: 28
GLOBULIN: 2.4
GLUCOSE: 96
HEMATOCRIT: 40.1
HEMOGLOBIN: 12.9
LYMPHOCYTES: 34.1
MCH: 29.5
MCHC: 32.2
MCV: 91.6
MONOCYTES: 8.9
MPV: 9.3
NEUTROPHILS: 54.1
PLATELET COUNT: 214
PLATELET COUNT: 214
POTASSIUM: 3.9
PROTEIN, TOTAL: 6.2
RDW: 13.3
RED BLOOD CELL COUNT: 4.38
SODIUM: 141
UREA NITROGEN (BUN): 16
WHITE BLOOD CELL COUNT: 5.5

## 2024-07-08 ENCOUNTER — TELEPHONE ENCOUNTER (OUTPATIENT)
Dept: URBAN - METROPOLITAN AREA CLINIC 74 | Facility: CLINIC | Age: 69
End: 2024-07-08

## 2024-08-05 ENCOUNTER — APPOINTMENT (RX ONLY)
Dept: URBAN - METROPOLITAN AREA CLINIC 162 | Facility: CLINIC | Age: 69
Setting detail: DERMATOLOGY
End: 2024-08-05

## 2024-08-05 DIAGNOSIS — D485 NEOPLASM OF UNCERTAIN BEHAVIOR OF SKIN: ICD-10-CM

## 2024-08-05 PROBLEM — D48.5 NEOPLASM OF UNCERTAIN BEHAVIOR OF SKIN: Status: ACTIVE | Noted: 2024-08-05

## 2024-08-05 PROCEDURE — ? SHAVE REMOVAL

## 2024-08-05 PROCEDURE — 11301 SHAVE SKIN LESION 0.6-1.0 CM: CPT

## 2024-08-05 PROCEDURE — 11311 SHAVE SKIN LESION 0.6-1.0 CM: CPT

## 2024-08-05 ASSESSMENT — LOCATION SIMPLE DESCRIPTION DERM
LOCATION SIMPLE: LEFT FOREHEAD
LOCATION SIMPLE: LEFT CLAVICULAR SKIN

## 2024-08-05 ASSESSMENT — LOCATION ZONE DERM
LOCATION ZONE: TRUNK
LOCATION ZONE: FACE

## 2024-08-05 ASSESSMENT — LOCATION DETAILED DESCRIPTION DERM
LOCATION DETAILED: LEFT CLAVICULAR SKIN
LOCATION DETAILED: LEFT INFERIOR FOREHEAD

## 2024-08-05 NOTE — PROCEDURE: SHAVE REMOVAL
Medical Necessity Information: It is in your best interest to select a reason for this procedure from the list below. All of these items fulfill various CMS LCD requirements except the new and changing color options.
Medical Necessity Clause: This procedure was medically necessary because the lesion that was treated was:
Lab: [id number]
Lab Facility: 0
Detail Level: Detailed
Was A Bandage Applied: Yes
Size Of Lesion In Cm (Required): 0.7
Depth Of Shave: dermis
Biopsy Method: Dermablade
Anesthesia Type: 1% lidocaine with epinephrine
Hemostasis: Drysol
Wound Care: Petrolatum
Render Path Notes In Note?: No
Consent was obtained from the patient. The risks and benefits to therapy were discussed in detail. Specifically, the risks of infection, scarring, bleeding, prolonged wound healing, incomplete removal, allergy to anesthesia, nerve injury and recurrence were addressed. Prior to the procedure, the treatment site was clearly identified and confirmed by the patient. All components of Universal Protocol/PAUSE Rule completed.
Post-Care Instructions: I reviewed with the patient in detail post-care instructions. Patient is to keep the biopsy site dry overnight, and then apply bacitracin twice daily until healed. Patient may apply hydrogen peroxide soaks to remove any crusting.
Notification Instructions: Patient will be notified of pathology results. However, patient instructed to call the office if not contacted within 2 weeks.
Billing Type: Third-Party Bill
Size Of Lesion In Cm (Required): 0.8

## 2024-09-13 ENCOUNTER — OFFICE VISIT (OUTPATIENT)
Dept: URBAN - METROPOLITAN AREA CLINIC 74 | Facility: CLINIC | Age: 69
End: 2024-09-13

## 2024-09-17 ENCOUNTER — OFFICE VISIT (OUTPATIENT)
Dept: URBAN - METROPOLITAN AREA CLINIC 74 | Facility: CLINIC | Age: 69
End: 2024-09-17

## 2024-09-17 NOTE — HPI-TODAY'S VISIT:
The patient is 69-year-old male with past medical history as noted below known to Dr. Ledezma is presenting to our clinic today to discuss his recent labsand Fibroscan results. Esomperazole 40 mg once daily. Colestipol 1 g every 12 hours. No new GI issues today.    Diagnostic stusies: -- Labs on 06/13/2024 CBC, CMP, GGT, FIB-4 INDEX 1.29 Low risk.   -- RUQ US on 02/13/2024 with fatty infiltration of the liver.  Status postcholecystectomy.  No free fluid.    Procedures: -- EGD with biopsy on 05/14/2024 by Dr. Ledezma noted normal esophagus.  Normal stomach.  Normal examined duodenum.  Biopsy with chronic gastritis.  No H.pylori organism or intestinal metaplasia.  -- Colonoscopy with polypectomy on 04/13/2023 by Dr. Ledezma noted one 3 mm polyp in the cecum, removed with a cold snare.  Resected and retrieved. One 3 mm polyp in the transverse colon, removed with a cold snare.  Resected and retrieved.  Diverticulosis in the sigmoid colon and in the descending colon.  Internal hemorrhoids.  Repeat colonoscopy in 5 years for surveillance.  Biopsy with hyperplastic and tubular adenoma colon polyps.

## 2024-10-01 PROBLEM — 62484002: Status: ACTIVE | Noted: 2024-10-01

## 2024-10-02 ENCOUNTER — OFFICE VISIT (OUTPATIENT)
Dept: URBAN - METROPOLITAN AREA CLINIC 74 | Facility: CLINIC | Age: 69
End: 2024-10-02
Payer: MEDICARE

## 2024-10-02 DIAGNOSIS — K21.9 CHRONIC GERD: ICD-10-CM

## 2024-10-02 DIAGNOSIS — K74.01 HEPATIC FIBROSIS, EARLY FIBROSIS: ICD-10-CM

## 2024-10-02 DIAGNOSIS — K29.30 CHRONIC SUPERFICIAL GASTRITIS WITHOUT BLEEDING: ICD-10-CM

## 2024-10-02 DIAGNOSIS — K76.0 HEPATIC STEATOSIS: ICD-10-CM

## 2024-10-02 DIAGNOSIS — K52.9 CHRONIC DIARRHEA: ICD-10-CM

## 2024-10-02 DIAGNOSIS — Z79.899 LONG-TERM CURRENT USE OF PROTON PUMP INHIBITOR THERAPY: ICD-10-CM

## 2024-10-02 PROCEDURE — 99214 OFFICE O/P EST MOD 30 MIN: CPT | Performed by: PHYSICIAN ASSISTANT

## 2024-10-02 RX ORDER — RESMETIROM 80 MG/1
ONE TABLET TABLET, COATED ORAL ONCE DAILY
Qty: 30 | Refills: 3 | OUTPATIENT
Start: 2024-10-02

## 2024-10-02 RX ORDER — LOSARTAN POTASSIUM 100 MG/1
1 TABLET TABLET, FILM COATED ORAL ONCE A DAY
Status: ACTIVE | COMMUNITY

## 2024-10-02 RX ORDER — COLESTIPOL HYDROCHLORIDE 1 G/1
1 TABLET TABLET, FILM COATED ORAL TWICE A DAY
Qty: 180 TABLET | Refills: 3 | Status: ACTIVE | COMMUNITY
Start: 2021-12-15

## 2024-10-02 RX ORDER — AMLODIPINE BESYLATE 5 MG/1
1 TABLET TABLET ORAL ONCE A DAY
Status: ACTIVE | COMMUNITY

## 2024-10-02 RX ORDER — RIVAROXABAN 20 MG/1
1 TABLET WITH FOOD TABLET, FILM COATED ORAL ONCE A DAY
Status: ACTIVE | COMMUNITY

## 2024-10-02 RX ORDER — ATORVASTATIN CALCIUM 80 MG/1
1 TABLET TABLET, FILM COATED ORAL ONCE A DAY
Status: ACTIVE | COMMUNITY
Start: 2024-10-02

## 2024-10-02 RX ORDER — ESOMEPRAZOLE MAGNESIUM 40 MG/1
1 CAPSULE CAPSULE, DELAYED RELEASE ORAL ONCE A DAY
Qty: 90 CAPSULE | Refills: 3 | COMMUNITY

## 2024-10-02 RX ORDER — ALLOPURINOL 100 MG/1
1 TABLET TABLET ORAL ONCE A DAY
Status: ACTIVE | COMMUNITY

## 2024-10-02 RX ORDER — ASPIRIN 81 MG/1
1 TABLET TABLET, COATED ORAL ONCE A DAY
Status: ACTIVE | COMMUNITY
Start: 2024-10-02

## 2024-10-02 RX ORDER — METOPROLOL TARTRATE 25 MG/1
1 TABLET WITH FOOD TABLET, FILM COATED ORAL TWICE A DAY
Status: ACTIVE | COMMUNITY

## 2024-10-02 NOTE — HPI-TODAY'S VISIT:
The patient is 69-year-old male with past medical history as noted below known to Dr. Ledezma is presenting to our clinic today to discuss his recent labsand Fibroscan results. Esomperazole 40 mg once daily. Colestipol 1 g every 12 hours. No new GI issues today.    Diagnostic stusies: -- FibroScan on 09/20/2024 at [facility] with the following interpretation: FibroScan result estimated to be stage F2 hepatic fibrosis.  Severe hepatic steatosis is present based on control attenuation parameter (CAP) reading.  Median liver stiffness of 8.0 kPa consistent with F2 stage hepatic fibrosis out of F4.  -- Labs on 06/13/2024 CBC, CMP, GGT, FIB-4 INDEX 1.29 Low risk.   -- RUQ US on 02/13/2024 with fatty infiltration of the liver.  Status postcholecystectomy.  No free fluid.    Procedures: -- EGD with biopsy on 05/14/2024 by Dr. Ledezma noted normal esophagus.  Normal stomach.  Normal examined duodenum.  Biopsy with chronic gastritis.  No H.pylori organism or intestinal metaplasia.  -- Colonoscopy with polypectomy on 04/13/2023 by Dr. Ledezma noted one 3 mm polyp in the cecum, removed with a cold snare.  Resected and retrieved. One 3 mm polyp in the transverse colon, removed with a cold snare.  Resected and retrieved.  Diverticulosis in the sigmoid colon and in the descending colon.  Internal hemorrhoids.  Repeat colonoscopy in 5 years for surveillance.  Biopsy with hyperplastic and tubular adenoma colon polyps.

## 2024-11-20 ENCOUNTER — TELEPHONE ENCOUNTER (OUTPATIENT)
Dept: URBAN - METROPOLITAN AREA CLINIC 74 | Facility: CLINIC | Age: 69
End: 2024-11-20

## 2024-11-26 ENCOUNTER — OFFICE VISIT (OUTPATIENT)
Dept: URBAN - METROPOLITAN AREA CLINIC 74 | Facility: CLINIC | Age: 69
End: 2024-11-26
Payer: MEDICARE

## 2024-11-26 VITALS
BODY MASS INDEX: 32.89 KG/M2 | TEMPERATURE: 98.6 F | WEIGHT: 197.4 LBS | SYSTOLIC BLOOD PRESSURE: 140 MMHG | HEART RATE: 65 BPM | DIASTOLIC BLOOD PRESSURE: 70 MMHG | HEIGHT: 65 IN

## 2024-11-26 DIAGNOSIS — K52.9 CHRONIC DIARRHEA: ICD-10-CM

## 2024-11-26 DIAGNOSIS — K76.0 HEPATIC STEATOSIS: ICD-10-CM

## 2024-11-26 DIAGNOSIS — K21.9 CHRONIC GERD: ICD-10-CM

## 2024-11-26 DIAGNOSIS — Z79.899 LONG-TERM CURRENT USE OF PROTON PUMP INHIBITOR THERAPY: ICD-10-CM

## 2024-11-26 DIAGNOSIS — K74.00 LIVER FIBROSIS: ICD-10-CM

## 2024-11-26 DIAGNOSIS — K29.30 CHRONIC SUPERFICIAL GASTRITIS WITHOUT BLEEDING: ICD-10-CM

## 2024-11-26 PROCEDURE — 99214 OFFICE O/P EST MOD 30 MIN: CPT | Performed by: PHYSICIAN ASSISTANT

## 2024-11-26 RX ORDER — ATORVASTATIN CALCIUM 80 MG/1
1 TABLET TABLET, FILM COATED ORAL ONCE A DAY
Status: ACTIVE | COMMUNITY
Start: 2024-10-02

## 2024-11-26 RX ORDER — ALLOPURINOL 100 MG/1
1 TABLET TABLET ORAL ONCE A DAY
Status: ACTIVE | COMMUNITY

## 2024-11-26 RX ORDER — LOSARTAN POTASSIUM 100 MG/1
1 TABLET TABLET, FILM COATED ORAL ONCE A DAY
Status: ACTIVE | COMMUNITY

## 2024-11-26 RX ORDER — COLESTIPOL HYDROCHLORIDE 1 G/1
1 TABLET TABLET, FILM COATED ORAL TWICE A DAY
Qty: 180 TABLET | Refills: 3
Start: 2021-12-15

## 2024-11-26 RX ORDER — ASPIRIN 81 MG/1
1 TABLET TABLET, COATED ORAL ONCE A DAY
Status: ACTIVE | COMMUNITY
Start: 2024-10-02

## 2024-11-26 RX ORDER — METOPROLOL TARTRATE 25 MG/1
1 TABLET WITH FOOD TABLET, FILM COATED ORAL TWICE A DAY
Status: ACTIVE | COMMUNITY

## 2024-11-26 RX ORDER — ESOMEPRAZOLE MAGNESIUM 40 MG/1
1 CAPSULE CAPSULE, DELAYED RELEASE ORAL ONCE A DAY
Qty: 90 CAPSULE | Refills: 3

## 2024-11-26 RX ORDER — RESMETIROM 80 MG/1
ONE TABLET TABLET, COATED ORAL ONCE DAILY
Qty: 30 | Refills: 3 | Status: ACTIVE | COMMUNITY
Start: 2024-10-02

## 2024-11-26 RX ORDER — ESOMEPRAZOLE MAGNESIUM 40 MG/1
1 CAPSULE CAPSULE, DELAYED RELEASE ORAL ONCE A DAY
Qty: 90 CAPSULE | Refills: 3 | Status: ACTIVE | COMMUNITY

## 2024-11-26 RX ORDER — RIVAROXABAN 20 MG/1
1 TABLET WITH FOOD TABLET, FILM COATED ORAL ONCE A DAY
Status: ACTIVE | COMMUNITY

## 2024-11-26 RX ORDER — COLESTIPOL HYDROCHLORIDE 1 G/1
1 TABLET TABLET, FILM COATED ORAL TWICE A DAY
Qty: 180 TABLET | Refills: 3 | Status: ACTIVE | COMMUNITY
Start: 2021-12-15

## 2024-11-26 RX ORDER — AMLODIPINE BESYLATE 5 MG/1
1 TABLET TABLET ORAL ONCE A DAY
Status: ACTIVE | COMMUNITY

## 2024-11-26 NOTE — HPI-TODAY'S VISIT:
The patient is 69-year-old male with past medical history as noted below known to Dr. Ledezma is presenting to our clinic today to discuss his recent labs and Fibroscan results. Esomperazole 40 mg once daily. Colestipol 1 g every 12 hours. The patient was supposed to start Rezdiffra 80 mg once daily but it cost $2000-$2500 per prescription. He has started his diet and excercise and he has lost 10-15 lbs.    Diagnostic stusies: -- FibroScan on 09/20/2024 at [facility] with the following interpretation: FibroScan result estimated to be stage F2 hepatic fibrosis.  Severe hepatic steatosis is present based on control attenuation parameter (CAP) reading.  Median liver stiffness of 8.0 kPa consistent with F2 stage hepatic fibrosis out of F4.  -- Labs on 06/13/2024 CBC, CMP, GGT, FIB-4 INDEX 1.29 Low risk.   -- RUQ US on 02/13/2024 with fatty infiltration of the liver.  Status postcholecystectomy.  No free fluid.    Procedures: -- EGD with biopsy on 05/14/2024 by Dr. Ledezma noted normal esophagus.  Normal stomach.  Normal examined duodenum.  Biopsy with chronic gastritis.  No H.pylori organism or intestinal metaplasia.  -- Colonoscopy with polypectomy on 04/13/2023 by Dr. Ledezma noted one 3 mm polyp in the cecum, removed with a cold snare.  Resected and retrieved. One 3 mm polyp in the transverse colon, removed with a cold snare.  Resected and retrieved.  Diverticulosis in the sigmoid colon and in the descending colon.  Internal hemorrhoids.  Repeat colonoscopy in 5 years for surveillance.  Biopsy with hyperplastic and tubular adenoma colon polyps.

## 2024-11-27 ENCOUNTER — TELEPHONE ENCOUNTER (OUTPATIENT)
Dept: URBAN - METROPOLITAN AREA CLINIC 74 | Facility: CLINIC | Age: 69
End: 2024-11-27

## 2024-11-27 PROBLEM — 69980003: Status: ACTIVE | Noted: 2024-11-27

## 2024-11-27 LAB
ALBUMIN/GLOBULIN RATIO: 1.9
ALBUMIN: 4.4
ALKALINE PHOSPHATASE: 85
ALT: 26
AST: 19
BILIRUBIN, TOTAL: 1.2
BUN/CREATININE RATIO: (no result)
CALCIUM: 9
CARBON DIOXIDE: 27
CHLORIDE: 106
CREATININE: 0.81
EGFR: 94
FIB 4 INDEX: 0.89
FIB 4 INTERPRETATION: (no result)
FIB 4 SUMMARY: (no result)
GLOBULIN: 2.3
GLUCOSE: 95
PLATELET COUNT: 297
POTASSIUM: 4
PROTEIN, TOTAL: 6.7
SODIUM: 142
UREA NITROGEN (BUN): 18

## 2024-12-28 ENCOUNTER — TELEPHONE ENCOUNTER (OUTPATIENT)
Dept: URBAN - METROPOLITAN AREA CLINIC 80 | Facility: CLINIC | Age: 69
End: 2024-12-28

## 2024-12-28 ENCOUNTER — CLAIMS CREATED FROM THE CLAIM WINDOW (OUTPATIENT)
Dept: URBAN - METROPOLITAN AREA MEDICAL CENTER 18 | Facility: MEDICAL CENTER | Age: 69
End: 2024-12-28

## 2024-12-28 PROCEDURE — 99254 IP/OBS CNSLTJ NEW/EST MOD 60: CPT | Performed by: STUDENT IN AN ORGANIZED HEALTH CARE EDUCATION/TRAINING PROGRAM

## 2024-12-30 ENCOUNTER — LAB OUTSIDE AN ENCOUNTER (OUTPATIENT)
Dept: URBAN - METROPOLITAN AREA CLINIC 80 | Facility: CLINIC | Age: 69
End: 2024-12-30

## 2024-12-30 PROBLEM — 1085861000119107: Status: ACTIVE | Noted: 2024-12-30

## 2025-01-02 ENCOUNTER — OFFICE VISIT (OUTPATIENT)
Dept: URBAN - METROPOLITAN AREA CLINIC 25 | Facility: CLINIC | Age: 70
End: 2025-01-02

## 2025-01-06 ENCOUNTER — OFFICE VISIT (OUTPATIENT)
Dept: URBAN - METROPOLITAN AREA CLINIC 40 | Facility: CLINIC | Age: 70
End: 2025-01-06

## 2025-01-07 ENCOUNTER — CLAIMS CREATED FROM THE CLAIM WINDOW (OUTPATIENT)
Dept: URBAN - METROPOLITAN AREA CLINIC 4 | Facility: CLINIC | Age: 70
End: 2025-01-07
Payer: MEDICARE

## 2025-01-07 ENCOUNTER — OFFICE VISIT (OUTPATIENT)
Dept: URBAN - METROPOLITAN AREA SURGERY CENTER 30 | Facility: SURGERY CENTER | Age: 70
End: 2025-01-07
Payer: MEDICARE

## 2025-01-07 ENCOUNTER — TELEPHONE ENCOUNTER (OUTPATIENT)
Dept: URBAN - METROPOLITAN AREA CLINIC 40 | Facility: CLINIC | Age: 70
End: 2025-01-07

## 2025-01-07 DIAGNOSIS — D12.2 ADENOMA OF ASCENDING COLON: ICD-10-CM

## 2025-01-07 DIAGNOSIS — D12.2 BENIGN NEOPLASM OF ASCENDING COLON: ICD-10-CM

## 2025-01-07 DIAGNOSIS — Z86.0100 HISTORY OF COLON POLYPS: ICD-10-CM

## 2025-01-07 DIAGNOSIS — Z83.719 FAMILY HISTORY OF COLON POLYPS, UNSPECIFIED: ICD-10-CM

## 2025-01-07 DIAGNOSIS — K63.3 ULCER OF INTESTINE: ICD-10-CM

## 2025-01-07 DIAGNOSIS — K52.89 OTHER SPECIFIED NONINFECTIVE GASTROENTERITIS AND COLITIS: ICD-10-CM

## 2025-01-07 DIAGNOSIS — K57.30 DIVERTICULA, COLON: ICD-10-CM

## 2025-01-07 DIAGNOSIS — K56.699 STENOSIS COLON: ICD-10-CM

## 2025-01-07 DIAGNOSIS — K50.00 CROHN'S DISEASE OF SMALL INTESTINE WITHOUT COMPLICATIONS: ICD-10-CM

## 2025-01-07 PROCEDURE — 45380 COLONOSCOPY AND BIOPSY: CPT | Performed by: CLINIC/CENTER

## 2025-01-07 PROCEDURE — 45380 COLONOSCOPY AND BIOPSY: CPT | Performed by: INTERNAL MEDICINE

## 2025-01-07 PROCEDURE — 88305 TISSUE EXAM BY PATHOLOGIST: CPT | Performed by: PATHOLOGY

## 2025-01-07 PROCEDURE — 00811 ANES LWR INTST NDSC NOS: CPT | Performed by: NURSE ANESTHETIST, CERTIFIED REGISTERED

## 2025-01-07 RX ORDER — ESOMEPRAZOLE MAGNESIUM 40 MG/1
1 CAPSULE CAPSULE, DELAYED RELEASE ORAL ONCE A DAY
Qty: 90 CAPSULE | Refills: 3 | Status: ACTIVE | COMMUNITY

## 2025-01-07 RX ORDER — RESMETIROM 80 MG/1
ONE TABLET TABLET, COATED ORAL ONCE DAILY
Qty: 30 | Refills: 3 | Status: ACTIVE | COMMUNITY
Start: 2024-10-02

## 2025-01-07 RX ORDER — RIVAROXABAN 20 MG/1
1 TABLET WITH FOOD TABLET, FILM COATED ORAL ONCE A DAY
Status: ACTIVE | COMMUNITY

## 2025-01-07 RX ORDER — ASPIRIN 81 MG/1
1 TABLET TABLET, COATED ORAL ONCE A DAY
Status: ACTIVE | COMMUNITY
Start: 2024-10-02

## 2025-01-07 RX ORDER — ALLOPURINOL 100 MG/1
1 TABLET TABLET ORAL ONCE A DAY
Status: ACTIVE | COMMUNITY

## 2025-01-07 RX ORDER — AMLODIPINE BESYLATE 5 MG/1
1 TABLET TABLET ORAL ONCE A DAY
Status: ACTIVE | COMMUNITY

## 2025-01-07 RX ORDER — COLESTIPOL HYDROCHLORIDE 1 G/1
1 TABLET TABLET, FILM COATED ORAL TWICE A DAY
Qty: 180 TABLET | Refills: 3 | Status: ACTIVE | COMMUNITY
Start: 2021-12-15

## 2025-01-07 RX ORDER — ATORVASTATIN CALCIUM 80 MG/1
1 TABLET TABLET, FILM COATED ORAL ONCE A DAY
Status: ACTIVE | COMMUNITY
Start: 2024-10-02

## 2025-01-07 RX ORDER — LOSARTAN POTASSIUM 100 MG/1
1 TABLET TABLET, FILM COATED ORAL ONCE A DAY
Status: ACTIVE | COMMUNITY

## 2025-01-07 RX ORDER — METOPROLOL TARTRATE 25 MG/1
1 TABLET WITH FOOD TABLET, FILM COATED ORAL TWICE A DAY
Status: ACTIVE | COMMUNITY

## 2025-01-07 RX ORDER — BUDESONIDE 3 MG/1
3 CAPSULES CAPSULE, DELAYED RELEASE PELLETS ORAL ONCE A DAY
Qty: 270 CAPSULE | Refills: 3 | OUTPATIENT
Start: 2025-01-07

## 2025-01-07 NOTE — HPI-TODAY'S VISIT:
-Recent SBO. -Seen at [facility], treated conservatively, no surgery. -CT suggested stricturing Crohn's Disease. -Fecal Calprotectin elevated 166. -CRP 4.9. -COLONOSCOPY today 1/7/25 confirms likely new onset Crohn's Disease of the TI with moderate stricture. Bx pending. PLAN: -Start Entocort/Budesonide 9mg daily. -Soft low residue diet. Advance prn. -Check IBD panel. -Follow up 2-3 weeks with me in [place] clinic.

## 2025-01-08 ENCOUNTER — LAB OUTSIDE AN ENCOUNTER (OUTPATIENT)
Dept: URBAN - METROPOLITAN AREA CLINIC 74 | Facility: CLINIC | Age: 70
End: 2025-01-08

## 2025-01-09 ENCOUNTER — TELEPHONE ENCOUNTER (OUTPATIENT)
Dept: URBAN - METROPOLITAN AREA CLINIC 74 | Facility: CLINIC | Age: 70
End: 2025-01-09

## 2025-01-09 LAB
ABSOLUTE BASOPHILS: 30
ABSOLUTE EOSINOPHILS: 121
ABSOLUTE LYMPHOCYTES: 1333
ABSOLUTE MONOCYTES: 475
ABSOLUTE NEUTROPHILS: 8141
ALBUMIN/GLOBULIN RATIO: 1.7
ALBUMIN: 4
ALKALINE PHOSPHATASE: 92
ALT: 22
AST: 14
BASOPHILS: 0.3
BILIRUBIN, TOTAL: 1.1
BUN/CREATININE RATIO: (no result)
CALCIUM: 8.7
CARBON DIOXIDE: 28
CHLORIDE: 106
CREATININE: 0.79
EGFR: 95
EOSINOPHILS: 1.2
FIB 4 INDEX: 0.79
FIB 4 INTERPRETATION: (no result)
FIB 4 SUMMARY: (no result)
GGT: 20
GLOBULIN: 2.3
GLUCOSE: 131
HEMATOCRIT: 39.6
HEMOGLOBIN: 12.5
LYMPHOCYTES: 13.2
MCH: 28.6
MCHC: 31.6
MCV: 90.6
MONOCYTES: 4.7
MPV: 10.1
NEUTROPHILS: 80.6
PLATELET COUNT: 268
PLATELET COUNT: 268
POTASSIUM: 4.1
PROTEIN, TOTAL: 6.3
RDW: 13.7
RED BLOOD CELL COUNT: 4.37
SODIUM: 144
UREA NITROGEN (BUN): 15
WHITE BLOOD CELL COUNT: 10.1

## 2025-01-21 ENCOUNTER — TELEPHONE ENCOUNTER (OUTPATIENT)
Dept: URBAN - METROPOLITAN AREA CLINIC 74 | Facility: CLINIC | Age: 70
End: 2025-01-21

## 2025-02-03 ENCOUNTER — APPOINTMENT (OUTPATIENT)
Dept: URBAN - METROPOLITAN AREA CLINIC 162 | Facility: CLINIC | Age: 70
Setting detail: DERMATOLOGY
End: 2025-02-03

## 2025-02-03 ENCOUNTER — TELEPHONE ENCOUNTER (OUTPATIENT)
Dept: URBAN - METROPOLITAN AREA CLINIC 74 | Facility: CLINIC | Age: 70
End: 2025-02-03

## 2025-02-03 ENCOUNTER — OFFICE VISIT (OUTPATIENT)
Dept: URBAN - METROPOLITAN AREA CLINIC 74 | Facility: CLINIC | Age: 70
End: 2025-02-03
Payer: MEDICARE

## 2025-02-03 VITALS
HEIGHT: 65 IN | BODY MASS INDEX: 32.15 KG/M2 | OXYGEN SATURATION: 96 % | TEMPERATURE: 97.7 F | SYSTOLIC BLOOD PRESSURE: 142 MMHG | HEART RATE: 58 BPM | WEIGHT: 193 LBS | DIASTOLIC BLOOD PRESSURE: 64 MMHG

## 2025-02-03 DIAGNOSIS — K50.018 CROHN'S DISEASE OF SMALL INTESTINE WITH OTHER COMPLICATION: ICD-10-CM

## 2025-02-03 DIAGNOSIS — L57.0 ACTINIC KERATOSIS: ICD-10-CM

## 2025-02-03 DIAGNOSIS — D485 NEOPLASM OF UNCERTAIN BEHAVIOR OF SKIN: ICD-10-CM

## 2025-02-03 DIAGNOSIS — D22 MELANOCYTIC NEVI: ICD-10-CM

## 2025-02-03 DIAGNOSIS — L82.1 OTHER SEBORRHEIC KERATOSIS: ICD-10-CM

## 2025-02-03 DIAGNOSIS — D18.0 HEMANGIOMA: ICD-10-CM

## 2025-02-03 DIAGNOSIS — R93.3 ABNORMAL CT SCAN, SMALL BOWEL: ICD-10-CM

## 2025-02-03 DIAGNOSIS — Z86.0100 HX OF COLONIC POLYPS: ICD-10-CM

## 2025-02-03 DIAGNOSIS — K50.019: ICD-10-CM

## 2025-02-03 DIAGNOSIS — K56.699 SMALL BOWEL STRICTURE: ICD-10-CM

## 2025-02-03 DIAGNOSIS — L81.4 OTHER MELANIN HYPERPIGMENTATION: ICD-10-CM

## 2025-02-03 PROBLEM — 56689002: Status: ACTIVE | Noted: 2025-02-03

## 2025-02-03 PROBLEM — D18.01 HEMANGIOMA OF SKIN AND SUBCUTANEOUS TISSUE: Status: ACTIVE | Noted: 2025-02-03

## 2025-02-03 PROBLEM — D22.5 MELANOCYTIC NEVI OF TRUNK: Status: ACTIVE | Noted: 2025-02-03

## 2025-02-03 PROBLEM — D48.5 NEOPLASM OF UNCERTAIN BEHAVIOR OF SKIN: Status: ACTIVE | Noted: 2025-02-03

## 2025-02-03 PROCEDURE — 17003 DESTRUCT PREMALG LES 2-14: CPT

## 2025-02-03 PROCEDURE — ? COUNSELING

## 2025-02-03 PROCEDURE — ? DEFER

## 2025-02-03 PROCEDURE — 99213 OFFICE O/P EST LOW 20 MIN: CPT | Mod: 25

## 2025-02-03 PROCEDURE — ? LIQUID NITROGEN

## 2025-02-03 PROCEDURE — 17000 DESTRUCT PREMALG LESION: CPT

## 2025-02-03 PROCEDURE — 99214 OFFICE O/P EST MOD 30 MIN: CPT | Performed by: INTERNAL MEDICINE

## 2025-02-03 RX ORDER — BUDESONIDE 3 MG/1
3 CAPSULES CAPSULE, DELAYED RELEASE PELLETS ORAL ONCE A DAY
Qty: 270 CAPSULE | Refills: 3 | Status: ACTIVE | COMMUNITY
Start: 2025-01-07

## 2025-02-03 RX ORDER — AMLODIPINE BESYLATE 5 MG/1
1 TABLET TABLET ORAL ONCE A DAY
Status: ACTIVE | COMMUNITY

## 2025-02-03 RX ORDER — ESOMEPRAZOLE MAGNESIUM 40 MG/1
1 CAPSULE CAPSULE, DELAYED RELEASE ORAL ONCE A DAY
Qty: 90 CAPSULE | Refills: 3 | Status: ACTIVE | COMMUNITY

## 2025-02-03 RX ORDER — RIVAROXABAN 20 MG/1
1 TABLET WITH FOOD TABLET, FILM COATED ORAL ONCE A DAY
Status: ACTIVE | COMMUNITY

## 2025-02-03 RX ORDER — LOSARTAN POTASSIUM 100 MG/1
1 TABLET TABLET, FILM COATED ORAL ONCE A DAY
Status: ACTIVE | COMMUNITY

## 2025-02-03 RX ORDER — METOPROLOL TARTRATE 25 MG/1
1 TABLET WITH FOOD TABLET, FILM COATED ORAL TWICE A DAY
Status: ACTIVE | COMMUNITY

## 2025-02-03 RX ORDER — ASPIRIN 81 MG/1
1 TABLET TABLET, COATED ORAL ONCE A DAY
Status: ACTIVE | COMMUNITY
Start: 2024-10-02

## 2025-02-03 RX ORDER — ALLOPURINOL 100 MG/1
1 TABLET TABLET ORAL ONCE A DAY
Status: ACTIVE | COMMUNITY

## 2025-02-03 RX ORDER — ATORVASTATIN CALCIUM 80 MG/1
1 TABLET TABLET, FILM COATED ORAL ONCE A DAY
Status: ACTIVE | COMMUNITY
Start: 2024-10-02

## 2025-02-03 RX ORDER — COLESTIPOL HYDROCHLORIDE 1 G/1
1 TABLET TABLET, FILM COATED ORAL TWICE A DAY
Qty: 180 TABLET | Refills: 3 | Status: ACTIVE | COMMUNITY
Start: 2021-12-15

## 2025-02-03 RX ORDER — RESMETIROM 80 MG/1
ONE TABLET TABLET, COATED ORAL ONCE DAILY
Qty: 30 | Refills: 3 | Status: ON HOLD | COMMUNITY
Start: 2024-10-02

## 2025-02-03 ASSESSMENT — LOCATION DETAILED DESCRIPTION DERM
LOCATION DETAILED: RIGHT SUPERIOR PARIETAL SCALP
LOCATION DETAILED: LEFT INFERIOR CENTRAL MALAR CHEEK
LOCATION DETAILED: SUPERIOR THORACIC SPINE
LOCATION DETAILED: LEFT MEDIAL UPPER BACK
LOCATION DETAILED: INFERIOR THORACIC SPINE
LOCATION DETAILED: RIGHT MEDIAL UPPER BACK
LOCATION DETAILED: LEFT SUPERIOR OCCIPITAL SCALP

## 2025-02-03 ASSESSMENT — LOCATION SIMPLE DESCRIPTION DERM
LOCATION SIMPLE: LEFT UPPER BACK
LOCATION SIMPLE: UPPER BACK
LOCATION SIMPLE: LEFT OCCIPITAL SCALP
LOCATION SIMPLE: LEFT CHEEK
LOCATION SIMPLE: SCALP
LOCATION SIMPLE: RIGHT UPPER BACK

## 2025-02-03 ASSESSMENT — LOCATION ZONE DERM
LOCATION ZONE: FACE
LOCATION ZONE: TRUNK
LOCATION ZONE: SCALP

## 2025-02-03 NOTE — PROCEDURE: DEFER
Size Of Lesion In Cm (Optional): 0
Introduction Text (Please End With A Colon): pt deferred ILK
Detail Level: Detailed
Reason To Defer Override: patient declined prescription, stated he had a bleaching cream at home

## 2025-02-03 NOTE — HPI-TODAY'S VISIT:
-Follow up visit, recently diagnosed Crohn's Disease. -Recent SBO. -Seen at [facility], treated conservatively, no surgery. -CT suggested stricturing Crohn's Disease. -Fecal Calprotectin elevated 166. -CRP 4.9. -COLONOSCOPY 1/7/25 confirmed new onset Crohn's Disease of the TI with moderate stricture. Bx positive for IBD/Crohn's ileitis. Small TA polyps removed from colon. PLAN: -Started Entocort/Budesonide 9mg daily. -IBD panel ordered but not done. -Follow up 2-3 weeks with me in [place] clinic today.    ======== Prior notes: The patient is 69-year-old male with past medical history as noted below known to Dr. Ledezma is presenting to our clinic today to discuss his recent labs and Fibroscan results. Esomperazole 40 mg once daily. Colestipol 1 g every 12 hours. The patient was supposed to start Rezdiffra 80 mg once daily but it cost $2000-$2500 per prescription. He has started his diet and excercise and he has lost 10-15 lbs.    Diagnostic stusies: -- FibroScan on 09/20/2024 at [facility] with the following interpretation: FibroScan result estimated to be stage F2 hepatic fibrosis.  Severe hepatic steatosis is present based on control attenuation parameter (CAP) reading.  Median liver stiffness of 8.0 kPa consistent with F2 stage hepatic fibrosis out of F4.  -- Labs on 06/13/2024 CBC, CMP, GGT, FIB-4 INDEX 1.29 Low risk.   -- RUQ US on 02/13/2024 with fatty infiltration of the liver.  Status postcholecystectomy.  No free fluid.    Procedures: -- EGD with biopsy on 05/14/2024 by Dr. Ledezma noted normal esophagus.  Normal stomach.  Normal examined duodenum.  Biopsy with chronic gastritis.  No H.pylori organism or intestinal metaplasia.  -- Colonoscopy with polypectomy on 04/13/2023 by Dr. Ledezma noted one 3 mm polyp in the cecum, removed with a cold snare.  Resected and retrieved. One 3 mm polyp in the transverse colon, removed with a cold snare.  Resected and retrieved.  Diverticulosis in the sigmoid colon and in the descending colon.  Internal hemorrhoids.  Repeat colonoscopy in 5 years for surveillance.  Biopsy with hyperplastic and tubular adenoma colon polyps.

## 2025-02-04 ENCOUNTER — TELEPHONE ENCOUNTER (OUTPATIENT)
Dept: URBAN - METROPOLITAN AREA CLINIC 23 | Facility: CLINIC | Age: 70
End: 2025-02-04

## 2025-02-07 LAB
HBSAG SCREEN: (no result)
HEP A AB, IGM: (no result)
HEP B CORE AB, IGM: (no result)
HEPATITIS C ANTIBODY: (no result)
MITOGEN-NIL: 3.66
QUANTIFERON NIL VALUE: 0.01
QUANTIFERON TB1 AG VALUE: 0
QUANTIFERON TB2 AG VALUE: 0
QUANTIFERON-TB GOLD PLUS: NEGATIVE

## 2025-02-19 ENCOUNTER — OFFICE VISIT (OUTPATIENT)
Dept: URBAN - METROPOLITAN AREA CLINIC 73 | Facility: CLINIC | Age: 70
End: 2025-02-19
Payer: MEDICARE

## 2025-02-19 VITALS
WEIGHT: 195 LBS | TEMPERATURE: 98.7 F | SYSTOLIC BLOOD PRESSURE: 131 MMHG | BODY MASS INDEX: 32.49 KG/M2 | HEART RATE: 58 BPM | HEIGHT: 65 IN | DIASTOLIC BLOOD PRESSURE: 63 MMHG

## 2025-02-19 DIAGNOSIS — K50.019 CICATRIZING ENTEROCOLITIS WITH COMPLICATION: ICD-10-CM

## 2025-02-19 PROCEDURE — 96413 CHEMO IV INFUSION 1 HR: CPT | Performed by: INTERNAL MEDICINE

## 2025-02-19 PROCEDURE — 96415 CHEMO IV INFUSION ADDL HR: CPT | Performed by: INTERNAL MEDICINE

## 2025-02-19 RX ORDER — ASPIRIN 81 MG/1
1 TABLET TABLET, COATED ORAL ONCE A DAY
Status: ACTIVE | COMMUNITY
Start: 2024-10-02

## 2025-02-19 RX ORDER — ESOMEPRAZOLE MAGNESIUM 40 MG/1
1 CAPSULE CAPSULE, DELAYED RELEASE ORAL ONCE A DAY
Qty: 90 CAPSULE | Refills: 3 | Status: ACTIVE | COMMUNITY

## 2025-02-19 RX ORDER — AMLODIPINE BESYLATE 5 MG/1
1 TABLET TABLET ORAL ONCE A DAY
Status: ACTIVE | COMMUNITY

## 2025-02-19 RX ORDER — RESMETIROM 80 MG/1
ONE TABLET TABLET, COATED ORAL ONCE DAILY
Qty: 30 | Refills: 3 | Status: ON HOLD | COMMUNITY
Start: 2024-10-02

## 2025-02-19 RX ORDER — ALLOPURINOL 100 MG/1
1 TABLET TABLET ORAL ONCE A DAY
Status: ACTIVE | COMMUNITY

## 2025-02-19 RX ORDER — METOPROLOL TARTRATE 25 MG/1
1 TABLET WITH FOOD TABLET, FILM COATED ORAL TWICE A DAY
Status: ACTIVE | COMMUNITY

## 2025-02-19 RX ORDER — COLESTIPOL HYDROCHLORIDE 1 G/1
1 TABLET TABLET, FILM COATED ORAL TWICE A DAY
Qty: 180 TABLET | Refills: 3 | Status: ACTIVE | COMMUNITY
Start: 2021-12-15

## 2025-02-19 RX ORDER — RIVAROXABAN 20 MG/1
1 TABLET WITH FOOD TABLET, FILM COATED ORAL ONCE A DAY
Status: ACTIVE | COMMUNITY

## 2025-02-19 RX ORDER — ATORVASTATIN CALCIUM 80 MG/1
1 TABLET TABLET, FILM COATED ORAL ONCE A DAY
Status: ACTIVE | COMMUNITY
Start: 2024-10-02

## 2025-02-19 RX ORDER — BUDESONIDE 3 MG/1
3 CAPSULES CAPSULE, DELAYED RELEASE PELLETS ORAL ONCE A DAY
Qty: 270 CAPSULE | Refills: 3 | Status: ACTIVE | COMMUNITY
Start: 2025-01-07

## 2025-02-19 RX ORDER — LOSARTAN POTASSIUM 100 MG/1
1 TABLET TABLET, FILM COATED ORAL ONCE A DAY
Status: ACTIVE | COMMUNITY

## 2025-03-04 ENCOUNTER — OFFICE VISIT (OUTPATIENT)
Dept: URBAN - METROPOLITAN AREA CLINIC 79 | Facility: CLINIC | Age: 70
End: 2025-03-04
Payer: MEDICARE

## 2025-03-04 VITALS
BODY MASS INDEX: 32.82 KG/M2 | WEIGHT: 197 LBS | HEART RATE: 61 BPM | RESPIRATION RATE: 21 BRPM | SYSTOLIC BLOOD PRESSURE: 161 MMHG | HEIGHT: 65 IN | TEMPERATURE: 97.7 F | DIASTOLIC BLOOD PRESSURE: 79 MMHG

## 2025-03-04 DIAGNOSIS — K50.019: ICD-10-CM

## 2025-03-04 PROCEDURE — 96415 CHEMO IV INFUSION ADDL HR: CPT | Performed by: INTERNAL MEDICINE

## 2025-03-04 PROCEDURE — 96413 CHEMO IV INFUSION 1 HR: CPT | Performed by: INTERNAL MEDICINE

## 2025-03-04 RX ORDER — ATORVASTATIN CALCIUM 80 MG/1
1 TABLET TABLET, FILM COATED ORAL ONCE A DAY
Status: ACTIVE | COMMUNITY
Start: 2024-10-02

## 2025-03-04 RX ORDER — METOPROLOL TARTRATE 25 MG/1
1 TABLET WITH FOOD TABLET, FILM COATED ORAL TWICE A DAY
Status: ACTIVE | COMMUNITY

## 2025-03-04 RX ORDER — ASPIRIN 81 MG/1
1 TABLET TABLET, COATED ORAL ONCE A DAY
Status: ACTIVE | COMMUNITY
Start: 2024-10-02

## 2025-03-04 RX ORDER — RESMETIROM 80 MG/1
ONE TABLET TABLET, COATED ORAL ONCE DAILY
Qty: 30 | Refills: 3 | Status: ON HOLD | COMMUNITY
Start: 2024-10-02

## 2025-03-04 RX ORDER — LOSARTAN POTASSIUM 100 MG/1
1 TABLET TABLET, FILM COATED ORAL ONCE A DAY
Status: ACTIVE | COMMUNITY

## 2025-03-04 RX ORDER — RIVAROXABAN 20 MG/1
1 TABLET WITH FOOD TABLET, FILM COATED ORAL ONCE A DAY
Status: ACTIVE | COMMUNITY

## 2025-03-04 RX ORDER — ALLOPURINOL 100 MG/1
1 TABLET TABLET ORAL ONCE A DAY
Status: ACTIVE | COMMUNITY

## 2025-03-04 RX ORDER — COLESTIPOL HYDROCHLORIDE 1 G/1
1 TABLET TABLET, FILM COATED ORAL TWICE A DAY
Qty: 180 TABLET | Refills: 3 | Status: ACTIVE | COMMUNITY
Start: 2021-12-15

## 2025-03-04 RX ORDER — BUDESONIDE 3 MG/1
3 CAPSULES CAPSULE, DELAYED RELEASE PELLETS ORAL ONCE A DAY
Qty: 270 CAPSULE | Refills: 3 | Status: ACTIVE | COMMUNITY
Start: 2025-01-07

## 2025-03-04 RX ORDER — AMLODIPINE BESYLATE 5 MG/1
1 TABLET TABLET ORAL ONCE A DAY
Status: ACTIVE | COMMUNITY

## 2025-03-04 RX ORDER — ESOMEPRAZOLE MAGNESIUM 40 MG/1
1 CAPSULE CAPSULE, DELAYED RELEASE ORAL ONCE A DAY
Qty: 90 CAPSULE | Refills: 3 | Status: ACTIVE | COMMUNITY

## 2025-04-01 ENCOUNTER — OFFICE VISIT (OUTPATIENT)
Dept: URBAN - METROPOLITAN AREA CLINIC 79 | Facility: CLINIC | Age: 70
End: 2025-04-01
Payer: MEDICARE

## 2025-04-01 DIAGNOSIS — K50.019: ICD-10-CM

## 2025-04-01 PROCEDURE — 96415 CHEMO IV INFUSION ADDL HR: CPT | Performed by: INTERNAL MEDICINE

## 2025-04-01 PROCEDURE — 96413 CHEMO IV INFUSION 1 HR: CPT | Performed by: INTERNAL MEDICINE

## 2025-04-01 RX ORDER — RESMETIROM 80 MG/1
ONE TABLET TABLET, COATED ORAL ONCE DAILY
Qty: 30 | Refills: 3 | Status: ON HOLD | COMMUNITY
Start: 2024-10-02

## 2025-04-01 RX ORDER — ASPIRIN 81 MG/1
1 TABLET TABLET, COATED ORAL ONCE A DAY
Status: ACTIVE | COMMUNITY
Start: 2024-10-02

## 2025-04-01 RX ORDER — LOSARTAN POTASSIUM 100 MG/1
1 TABLET TABLET, FILM COATED ORAL ONCE A DAY
Status: ACTIVE | COMMUNITY

## 2025-04-01 RX ORDER — RIVAROXABAN 20 MG/1
1 TABLET WITH FOOD TABLET, FILM COATED ORAL ONCE A DAY
Status: ACTIVE | COMMUNITY

## 2025-04-01 RX ORDER — ALLOPURINOL 100 MG/1
1 TABLET TABLET ORAL ONCE A DAY
Status: ACTIVE | COMMUNITY

## 2025-04-01 RX ORDER — METOPROLOL TARTRATE 25 MG/1
1 TABLET WITH FOOD TABLET, FILM COATED ORAL TWICE A DAY
Status: ACTIVE | COMMUNITY

## 2025-04-01 RX ORDER — ESOMEPRAZOLE MAGNESIUM 40 MG/1
1 CAPSULE CAPSULE, DELAYED RELEASE ORAL ONCE A DAY
Qty: 90 CAPSULE | Refills: 3 | Status: ACTIVE | COMMUNITY

## 2025-04-01 RX ORDER — COLESTIPOL HYDROCHLORIDE 1 G/1
1 TABLET TABLET, FILM COATED ORAL TWICE A DAY
Qty: 180 TABLET | Refills: 3 | Status: ACTIVE | COMMUNITY
Start: 2021-12-15

## 2025-04-01 RX ORDER — BUDESONIDE 3 MG/1
3 CAPSULES CAPSULE, DELAYED RELEASE PELLETS ORAL ONCE A DAY
Qty: 270 CAPSULE | Refills: 3 | Status: ACTIVE | COMMUNITY
Start: 2025-01-07

## 2025-04-01 RX ORDER — AMLODIPINE BESYLATE 5 MG/1
1 TABLET TABLET ORAL ONCE A DAY
Status: ACTIVE | COMMUNITY

## 2025-04-01 RX ORDER — ATORVASTATIN CALCIUM 80 MG/1
1 TABLET TABLET, FILM COATED ORAL ONCE A DAY
Status: ACTIVE | COMMUNITY
Start: 2024-10-02

## 2025-04-07 ENCOUNTER — TELEPHONE ENCOUNTER (OUTPATIENT)
Dept: URBAN - METROPOLITAN AREA CLINIC 74 | Facility: CLINIC | Age: 70
End: 2025-04-07

## 2025-04-23 ENCOUNTER — OFFICE VISIT (OUTPATIENT)
Dept: URBAN - METROPOLITAN AREA CLINIC 74 | Facility: CLINIC | Age: 70
End: 2025-04-23
Payer: MEDICARE

## 2025-04-23 DIAGNOSIS — K56.699 SMALL BOWEL STRICTURE: ICD-10-CM

## 2025-04-23 DIAGNOSIS — K50.018 CROHN'S DISEASE OF SMALL INTESTINE WITH OTHER COMPLICATION: ICD-10-CM

## 2025-04-23 DIAGNOSIS — R93.3 ABNORMAL CT SCAN, SMALL BOWEL: ICD-10-CM

## 2025-04-23 DIAGNOSIS — Z86.0100 HX OF COLONIC POLYPS: ICD-10-CM

## 2025-04-23 PROCEDURE — 99214 OFFICE O/P EST MOD 30 MIN: CPT | Performed by: INTERNAL MEDICINE

## 2025-04-23 RX ORDER — ESOMEPRAZOLE MAGNESIUM 40 MG/1
1 CAPSULE CAPSULE, DELAYED RELEASE ORAL ONCE A DAY
Qty: 90 CAPSULE | Refills: 3 | Status: ACTIVE | COMMUNITY

## 2025-04-23 RX ORDER — ASPIRIN 81 MG/1
1 TABLET TABLET, COATED ORAL ONCE A DAY
Status: ACTIVE | COMMUNITY
Start: 2024-10-02

## 2025-04-23 RX ORDER — AMLODIPINE BESYLATE 5 MG/1
1 TABLET TABLET ORAL ONCE A DAY
Status: ACTIVE | COMMUNITY

## 2025-04-23 RX ORDER — RIVAROXABAN 20 MG/1
1 TABLET WITH FOOD TABLET, FILM COATED ORAL ONCE A DAY
Status: ACTIVE | COMMUNITY

## 2025-04-23 RX ORDER — ATORVASTATIN CALCIUM 80 MG/1
1 TABLET TABLET, FILM COATED ORAL ONCE A DAY
Status: ACTIVE | COMMUNITY
Start: 2024-10-02

## 2025-04-23 RX ORDER — BUDESONIDE 3 MG/1
3 CAPSULES CAPSULE, DELAYED RELEASE PELLETS ORAL ONCE A DAY
Qty: 270 CAPSULE | Refills: 3 | Status: ON HOLD | COMMUNITY
Start: 2025-01-07

## 2025-04-23 RX ORDER — LOSARTAN POTASSIUM 100 MG/1
1 TABLET TABLET, FILM COATED ORAL ONCE A DAY
Status: ACTIVE | COMMUNITY

## 2025-04-23 RX ORDER — ALLOPURINOL 100 MG/1
1 TABLET TABLET ORAL ONCE A DAY
Status: ACTIVE | COMMUNITY

## 2025-04-23 RX ORDER — RESMETIROM 80 MG/1
ONE TABLET TABLET, COATED ORAL ONCE DAILY
Qty: 30 | Refills: 3 | Status: ON HOLD | COMMUNITY
Start: 2024-10-02

## 2025-04-23 RX ORDER — METOPROLOL TARTRATE 25 MG/1
1 TABLET WITH FOOD TABLET, FILM COATED ORAL TWICE A DAY
Status: ACTIVE | COMMUNITY

## 2025-04-23 RX ORDER — COLESTIPOL HYDROCHLORIDE 1 G/1
1 TABLET TABLET, FILM COATED ORAL TWICE A DAY
Qty: 180 TABLET | Refills: 3 | Status: ACTIVE | COMMUNITY
Start: 2021-12-15

## 2025-04-23 NOTE — HPI-TODAY'S VISIT:
-Follow up visit, recently diagnosed Crohn's Disease. -Recent SBO. -Seen at [facility], treated conservatively, no surgery. -CT suggested stricturing Crohn's Disease. -Fecal Calprotectin elevated 166. -CRP 4.9. -COLONOSCOPY 1/7/25 confirmed new onset Crohn's Disease of the TI with moderate stricture. Bx positive for IBD/Crohn's ileitis. Small TA polyps removed from colon. PLAN: -Started Entocort/Budesonide 9mg daily. -Started Remicade (biosimilar), now s/p 5 mg/kg induction at 0,2,6 weeks. -Follow up to review response.   ======== Prior notes: The patient is 69-year-old male with past medical history as noted below known to Dr. Ledezma is presenting to our clinic today to discuss his recent labs and Fibroscan results. Esomperazole 40 mg once daily. Colestipol 1 g every 12 hours. The patient was supposed to start Rezdiffra 80 mg once daily but it cost $2000-$2500 per prescription. He has started his diet and excercise and he has lost 10-15 lbs.    Diagnostic stusies: -- FibroScan on 09/20/2024 at [facility] with the following interpretation: FibroScan result estimated to be stage F2 hepatic fibrosis.  Severe hepatic steatosis is present based on control attenuation parameter (CAP) reading.  Median liver stiffness of 8.0 kPa consistent with F2 stage hepatic fibrosis out of F4.  -- Labs on 06/13/2024 CBC, CMP, GGT, FIB-4 INDEX 1.29 Low risk.   -- RUQ US on 02/13/2024 with fatty infiltration of the liver.  Status postcholecystectomy.  No free fluid.    Procedures: -- EGD with biopsy on 05/14/2024 by Dr. Ledezma noted normal esophagus.  Normal stomach.  Normal examined duodenum.  Biopsy with chronic gastritis.  No H.pylori organism or intestinal metaplasia.  -- Colonoscopy with polypectomy on 04/13/2023 by Dr. Ledezma noted one 3 mm polyp in the cecum, removed with a cold snare.  Resected and retrieved. One 3 mm polyp in the transverse colon, removed with a cold snare.  Resected and retrieved.  Diverticulosis in the sigmoid colon and in the descending colon.  Internal hemorrhoids.  Repeat colonoscopy in 5 years for surveillance.  Biopsy with hyperplastic and tubular adenoma colon polyps.

## 2025-04-29 ENCOUNTER — TELEPHONE ENCOUNTER (OUTPATIENT)
Dept: URBAN - METROPOLITAN AREA CLINIC 74 | Facility: CLINIC | Age: 70
End: 2025-04-29

## 2025-05-08 ENCOUNTER — APPOINTMENT (OUTPATIENT)
Dept: URBAN - METROPOLITAN AREA CLINIC 162 | Facility: CLINIC | Age: 70
Setting detail: DERMATOLOGY
End: 2025-05-08

## 2025-05-08 DIAGNOSIS — D22 MELANOCYTIC NEVI: ICD-10-CM

## 2025-05-08 DIAGNOSIS — L81.4 OTHER MELANIN HYPERPIGMENTATION: ICD-10-CM

## 2025-05-08 DIAGNOSIS — L82.0 INFLAMED SEBORRHEIC KERATOSIS: ICD-10-CM

## 2025-05-08 DIAGNOSIS — L82.1 OTHER SEBORRHEIC KERATOSIS: ICD-10-CM

## 2025-05-08 DIAGNOSIS — B07.8 OTHER VIRAL WARTS: ICD-10-CM

## 2025-05-08 PROBLEM — D22.39 MELANOCYTIC NEVI OF OTHER PARTS OF FACE: Status: ACTIVE | Noted: 2025-05-08

## 2025-05-08 PROCEDURE — ? LIQUID NITROGEN

## 2025-05-08 PROCEDURE — 99213 OFFICE O/P EST LOW 20 MIN: CPT | Mod: 25

## 2025-05-08 PROCEDURE — ? PRESCRIPTION

## 2025-05-08 PROCEDURE — ? COUNSELING

## 2025-05-08 PROCEDURE — 17110 DESTRUCTION B9 LES UP TO 14: CPT

## 2025-05-08 RX ORDER — TRETIONIN 0.25 MG/G
CREAM TOPICAL
Qty: 45 | Refills: 1 | Status: ERX | COMMUNITY
Start: 2025-05-08

## 2025-05-08 RX ADMIN — TRETIONIN: 0.25 CREAM TOPICAL at 00:00

## 2025-05-08 ASSESSMENT — LOCATION DETAILED DESCRIPTION DERM
LOCATION DETAILED: RIGHT MEDIAL FOREHEAD
LOCATION DETAILED: LEFT INFERIOR FOREHEAD
LOCATION DETAILED: LEFT INFERIOR CENTRAL MALAR CHEEK
LOCATION DETAILED: LEFT MEDIAL FOREHEAD
LOCATION DETAILED: RIGHT CENTRAL TEMPLE
LOCATION DETAILED: RIGHT INFERIOR CENTRAL MALAR CHEEK
LOCATION DETAILED: RIGHT INFERIOR FRONTAL SCALP

## 2025-05-08 ASSESSMENT — LOCATION SIMPLE DESCRIPTION DERM
LOCATION SIMPLE: RIGHT FOREHEAD
LOCATION SIMPLE: RIGHT CHEEK
LOCATION SIMPLE: LEFT FOREHEAD
LOCATION SIMPLE: RIGHT TEMPLE
LOCATION SIMPLE: LEFT CHEEK
LOCATION SIMPLE: SCALP

## 2025-05-08 ASSESSMENT — LOCATION ZONE DERM
LOCATION ZONE: SCALP
LOCATION ZONE: FACE

## 2025-05-08 NOTE — PROCEDURE: LIQUID NITROGEN
Medical Necessity Clause: This procedure was medically necessary because the lesions that were treated were:
Spray Paint Technique: No
Spray Paint Text: The liquid nitrogen was applied to the skin utilizing a spray paint frosting technique.
Show Applicator Variable?: Yes
Post-Care Instructions: I reviewed with the patient in detail post-care instructions. Patient is to wear sunprotection, and avoid picking at any of the treated lesions. Pt may apply Vaseline to crusted or scabbing areas.
Detail Level: Simple
Consent: The patient's consent was obtained including but not limited to risks of crusting, scabbing, blistering, scarring, darker or lighter pigmentary change, recurrence, incomplete removal and infection.
Medical Necessity Information: It is in your best interest to select a reason for this procedure from the list below. All of these items fulfill various CMS LCD requirements except the new and changing color options.

## 2025-05-27 ENCOUNTER — OFFICE VISIT (OUTPATIENT)
Dept: URBAN - METROPOLITAN AREA CLINIC 74 | Facility: CLINIC | Age: 70
End: 2025-05-27
Payer: MEDICARE

## 2025-05-27 DIAGNOSIS — E55.9 VITAMIN D DEFICIENCY: ICD-10-CM

## 2025-05-27 DIAGNOSIS — K29.30 CHRONIC SUPERFICIAL GASTRITIS WITHOUT BLEEDING: ICD-10-CM

## 2025-05-27 DIAGNOSIS — K50.80 CROHN'S DISEASE OF BOTH SMALL AND LARGE INTESTINE WITHOUT COMPLICATION: ICD-10-CM

## 2025-05-27 DIAGNOSIS — K56.699 SMALL BOWEL STRICTURE: ICD-10-CM

## 2025-05-27 DIAGNOSIS — K76.0 HEPATIC STEATOSIS: ICD-10-CM

## 2025-05-27 DIAGNOSIS — Z86.0101 HISTORY OF ADENOMATOUS POLYP OF COLON: ICD-10-CM

## 2025-05-27 PROBLEM — 71833008: Status: ACTIVE | Noted: 2025-05-27

## 2025-05-27 PROBLEM — 34713006: Status: ACTIVE | Noted: 2025-05-27

## 2025-05-27 PROCEDURE — 99214 OFFICE O/P EST MOD 30 MIN: CPT | Performed by: PHYSICIAN ASSISTANT

## 2025-05-27 RX ORDER — LOSARTAN POTASSIUM 100 MG/1
1 TABLET TABLET, FILM COATED ORAL ONCE A DAY
Status: ACTIVE | COMMUNITY

## 2025-05-27 RX ORDER — RIVAROXABAN 20 MG/1
1 TABLET WITH FOOD TABLET, FILM COATED ORAL ONCE A DAY
Status: ACTIVE | COMMUNITY

## 2025-05-27 RX ORDER — METOPROLOL TARTRATE 25 MG/1
1 TABLET WITH FOOD TABLET, FILM COATED ORAL TWICE A DAY
Status: ACTIVE | COMMUNITY

## 2025-05-27 RX ORDER — AMLODIPINE BESYLATE 5 MG/1
1 TABLET TABLET ORAL ONCE A DAY
Status: ACTIVE | COMMUNITY

## 2025-05-27 RX ORDER — ASPIRIN 81 MG/1
1 TABLET TABLET, COATED ORAL ONCE A DAY
Status: ACTIVE | COMMUNITY
Start: 2024-10-02

## 2025-05-27 RX ORDER — COLESTIPOL HYDROCHLORIDE 1 G/1
1 TABLET TABLET, FILM COATED ORAL TWICE A DAY
Qty: 180 TABLET | Refills: 3 | Status: ACTIVE | COMMUNITY
Start: 2021-12-15

## 2025-05-27 RX ORDER — ESOMEPRAZOLE MAGNESIUM 40 MG/1
1 CAPSULE CAPSULE, DELAYED RELEASE ORAL ONCE A DAY
Qty: 90 CAPSULE | Refills: 3 | Status: ACTIVE | COMMUNITY

## 2025-05-27 RX ORDER — ATORVASTATIN CALCIUM 80 MG/1
1 TABLET TABLET, FILM COATED ORAL ONCE A DAY
Status: ACTIVE | COMMUNITY
Start: 2024-10-02

## 2025-05-27 RX ORDER — ALLOPURINOL 100 MG/1
1 TABLET TABLET ORAL ONCE A DAY
Status: ACTIVE | COMMUNITY

## 2025-05-27 NOTE — HPI-TODAY'S VISIT:
The patient is 70-year-old male with past medical history as noted below known to Dr. Ledezma is presenting to our clinic today for 6 months follow up for Crohn's disease. He started on Infliximab 5 mg/kg in 02/2025. He comepleted induction dose and he is now on maintanence dose every 8 weeks.  He also continues on Esomperazole 40 mg once daily and Colestipol 1 g every 12 hours as needed. No new GI symptoms. He is moving his bowels daily. No hematochezia. No joint pain. No fever or chills. No nocturnal symptoms.   Diagnostic stusies: -- Labs on 02/04/2025 acute hepatitis panel and QuantiFERONTB undetectable.  -- Labs on 01/08/2025 CBC with WBC 10.1, hemoglobin 12.5, hematocrit 39.6, and platelet 268.  GGT 20.  CMP with BUN 15, creatinine 0.79, ALP 92, AST 14, ALT 22, and TB 1.1. FIB-4 INDEX 0.79 at low risk.  -- FibroScan on 09/20/2024 at [facility] with the following interpretation: FibroScan result estimated to be stage F2 hepatic fibrosis.  Severe hepatic steatosis is present based on control attenuation parameter (CAP) reading.  Median liver stiffness of 8.0 kPa consistent with F2 stage hepatic fibrosis out of F4.  -- Stool study on 12/28/2020 4 GI multiplex unremarkable.  WBC normal.  Fecal calprotectin 166.  -- CT sacn of abdomen and pelvis on 12/ 24/2024 noted compatible with small bowel obstruction. Relative transition at the level of the ileum with ileal wall thickening. Findings raise concern for inflammatory bowel disease, including stricturing Crohn's disease. Mild mesenteric edema. No fluid collection or evidence for perforation.   Procedures: -- Colonoscopy with polypectomy on 01/07/2025 by Dr. Ledezma noted preparation of the colon was fair.  Moderate inflammation was found in the ileum, rule out Crohn's disease.  Stricture in the terminal ileum.  Able to visualize the distal 2-3 cm of the ileum, but stricture prevented further advancement.  Three diminutive 1-3 mm polyps in the ascending colon, removed cold biopsy forcep.  Resected and retrieved.  Diverticulosis in sigmoid colon and the descending colon.  Internal hemorrhoids.  Biopsy tubular adenoma colon polyps.  Terminal ileum biopsy consistent with chronic active ileitis with aphthous ulcer and pyloric gland metaplasia.  Consistent with Crohn's disease.  Negative for dysplasia or malignancy.  -- EGD with biopsy on 05/14/2024 by Dr. Ledezma noted normal esophagus.  Normal stomach.  Normal examined duodenum.  Biopsy with chronic gastritis.  No H.pylori organism or intestinal metaplasia.  -- Colonoscopy with polypectomy on 04/13/2023 by Dr. Ledezma noted one 3 mm polyp in the cecum, removed with a cold snare.  Resected and retrieved. One 3 mm polyp in the transverse colon, removed with a cold snare.  Resected and retrieved.  Diverticulosis in the sigmoid colon and in the descending colon.  Internal hemorrhoids.  Repeat colonoscopy in 5 years for surveillance.  Biopsy with hyperplastic and tubular adenoma colon polyps.

## 2025-05-28 ENCOUNTER — OFFICE VISIT (OUTPATIENT)
Dept: URBAN - METROPOLITAN AREA CLINIC 73 | Facility: CLINIC | Age: 70
End: 2025-05-28

## 2025-05-28 LAB
% SATURATION: 18
ABSOLUTE BASOPHILS: 42
ABSOLUTE EOSINOPHILS: 186
ABSOLUTE LYMPHOCYTES: 1740
ABSOLUTE MONOCYTES: 546
ABSOLUTE NEUTROPHILS: 3486
ALBUMIN/GLOBULIN RATIO: 1.5
ALBUMIN: 4
ALKALINE PHOSPHATASE: 75
ALT: 35
AST: 25
BASOPHILS: 0.7
BILIRUBIN, TOTAL: 1.3
BUN/CREATININE RATIO: 20
C-REACTIVE PROTEIN, QUANT: <3
CALCIUM: 9.1
CARBON DIOXIDE: 28
CHLORIDE: 104
CREATININE: 0.64
EOSINOPHILS: 3.1
FERRITIN: 22
GLOBULIN: 2.6
GLUCOSE: 102
HEMATOCRIT: 41.4
HEMOGLOBIN: 12.9
IRON BINDING CAPACITY: 363
IRON, TOTAL: 67
LYMPHOCYTES: 29
MCH: 28.5
MCHC: 31.2
MCV: 91.4
MONOCYTES: 9.1
MPV: 9.6
NEUTROPHILS: 58.1
PLATELET COUNT: 281
POTASSIUM: 3.9
PROTEIN, TOTAL: 6.6
RDW: 14.8
RED BLOOD CELL COUNT: 4.53
SODIUM: 141
UREA NITROGEN (BUN): 13
VITAMIN D,25-OH,TOTAL,IA: 42
WHITE BLOOD CELL COUNT: 6

## 2025-05-29 ENCOUNTER — OFFICE VISIT (OUTPATIENT)
Dept: URBAN - METROPOLITAN AREA CLINIC 73 | Facility: CLINIC | Age: 70
End: 2025-05-29
Payer: MEDICARE

## 2025-05-29 DIAGNOSIS — K50.019: ICD-10-CM

## 2025-05-29 PROCEDURE — 96415 CHEMO IV INFUSION ADDL HR: CPT | Performed by: INTERNAL MEDICINE

## 2025-05-29 PROCEDURE — 96413 CHEMO IV INFUSION 1 HR: CPT | Performed by: INTERNAL MEDICINE

## 2025-05-29 RX ORDER — ASPIRIN 81 MG/1
1 TABLET TABLET, COATED ORAL ONCE A DAY
Status: ACTIVE | COMMUNITY
Start: 2024-10-02

## 2025-05-29 RX ORDER — RIVAROXABAN 20 MG/1
1 TABLET WITH FOOD TABLET, FILM COATED ORAL ONCE A DAY
Status: ACTIVE | COMMUNITY

## 2025-05-29 RX ORDER — LOSARTAN POTASSIUM 100 MG/1
1 TABLET TABLET, FILM COATED ORAL ONCE A DAY
Status: ACTIVE | COMMUNITY

## 2025-05-29 RX ORDER — ALLOPURINOL 100 MG/1
1 TABLET TABLET ORAL ONCE A DAY
Status: ACTIVE | COMMUNITY

## 2025-05-29 RX ORDER — ATORVASTATIN CALCIUM 80 MG/1
1 TABLET TABLET, FILM COATED ORAL ONCE A DAY
Status: ACTIVE | COMMUNITY
Start: 2024-10-02

## 2025-05-29 RX ORDER — COLESTIPOL HYDROCHLORIDE 1 G/1
1 TABLET TABLET, FILM COATED ORAL TWICE A DAY
Qty: 180 TABLET | Refills: 3 | Status: ACTIVE | COMMUNITY
Start: 2021-12-15

## 2025-05-29 RX ORDER — METOPROLOL TARTRATE 25 MG/1
1 TABLET WITH FOOD TABLET, FILM COATED ORAL TWICE A DAY
Status: ACTIVE | COMMUNITY

## 2025-05-29 RX ORDER — AMLODIPINE BESYLATE 5 MG/1
1 TABLET TABLET ORAL ONCE A DAY
Status: ACTIVE | COMMUNITY

## 2025-05-29 RX ORDER — ESOMEPRAZOLE MAGNESIUM 40 MG/1
1 CAPSULE CAPSULE, DELAYED RELEASE ORAL ONCE A DAY
Qty: 90 CAPSULE | Refills: 3 | Status: ACTIVE | COMMUNITY

## 2025-07-23 ENCOUNTER — OFFICE VISIT (OUTPATIENT)
Dept: URBAN - METROPOLITAN AREA CLINIC 73 | Facility: CLINIC | Age: 70
End: 2025-07-23
Payer: MEDICARE

## 2025-07-23 DIAGNOSIS — K50.019: ICD-10-CM

## 2025-07-23 PROCEDURE — 96415 CHEMO IV INFUSION ADDL HR: CPT | Performed by: INTERNAL MEDICINE

## 2025-07-23 PROCEDURE — 96413 CHEMO IV INFUSION 1 HR: CPT | Performed by: INTERNAL MEDICINE

## 2025-07-23 RX ORDER — AMLODIPINE BESYLATE 5 MG/1
1 TABLET TABLET ORAL ONCE A DAY
Status: ACTIVE | COMMUNITY

## 2025-07-23 RX ORDER — LOSARTAN POTASSIUM 100 MG/1
1 TABLET TABLET, FILM COATED ORAL ONCE A DAY
Status: ACTIVE | COMMUNITY

## 2025-07-23 RX ORDER — ATORVASTATIN CALCIUM 80 MG/1
1 TABLET TABLET, FILM COATED ORAL ONCE A DAY
Status: ACTIVE | COMMUNITY
Start: 2024-10-02

## 2025-07-23 RX ORDER — RIVAROXABAN 20 MG/1
1 TABLET WITH FOOD TABLET, FILM COATED ORAL ONCE A DAY
Status: ACTIVE | COMMUNITY

## 2025-07-23 RX ORDER — ASPIRIN 81 MG/1
1 TABLET TABLET, COATED ORAL ONCE A DAY
Status: ACTIVE | COMMUNITY
Start: 2024-10-02

## 2025-07-23 RX ORDER — ESOMEPRAZOLE MAGNESIUM 40 MG/1
1 CAPSULE CAPSULE, DELAYED RELEASE ORAL ONCE A DAY
Qty: 90 CAPSULE | Refills: 3 | Status: ACTIVE | COMMUNITY

## 2025-07-23 RX ORDER — COLESTIPOL HYDROCHLORIDE 1 G/1
1 TABLET TABLET, FILM COATED ORAL TWICE A DAY
Qty: 180 TABLET | Refills: 3 | Status: ACTIVE | COMMUNITY
Start: 2021-12-15

## 2025-07-23 RX ORDER — ALLOPURINOL 100 MG/1
1 TABLET TABLET ORAL ONCE A DAY
Status: ACTIVE | COMMUNITY

## 2025-07-23 RX ORDER — METOPROLOL TARTRATE 25 MG/1
1 TABLET WITH FOOD TABLET, FILM COATED ORAL TWICE A DAY
Status: ACTIVE | COMMUNITY